# Patient Record
Sex: MALE | Race: WHITE | NOT HISPANIC OR LATINO | URBAN - METROPOLITAN AREA
[De-identification: names, ages, dates, MRNs, and addresses within clinical notes are randomized per-mention and may not be internally consistent; named-entity substitution may affect disease eponyms.]

---

## 2017-06-27 ENCOUNTER — INPATIENT (INPATIENT)
Facility: HOSPITAL | Age: 52
LOS: 2 days | Discharge: HOME | End: 2017-06-30
Attending: INTERNAL MEDICINE

## 2017-07-05 DIAGNOSIS — M19.071 PRIMARY OSTEOARTHRITIS, RIGHT ANKLE AND FOOT: ICD-10-CM

## 2017-07-05 DIAGNOSIS — G62.9 POLYNEUROPATHY, UNSPECIFIED: ICD-10-CM

## 2017-07-05 DIAGNOSIS — Z82.49 FAMILY HISTORY OF ISCHEMIC HEART DISEASE AND OTHER DISEASES OF THE CIRCULATORY SYSTEM: ICD-10-CM

## 2017-07-05 DIAGNOSIS — M79.2 NEURALGIA AND NEURITIS, UNSPECIFIED: ICD-10-CM

## 2017-07-05 DIAGNOSIS — L03.115 CELLULITIS OF RIGHT LOWER LIMB: ICD-10-CM

## 2017-07-05 DIAGNOSIS — M54.32 SCIATICA, LEFT SIDE: ICD-10-CM

## 2017-07-05 DIAGNOSIS — M54.31 SCIATICA, RIGHT SIDE: ICD-10-CM
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
## 2017-07-05 DIAGNOSIS — M51.26 OTHER INTERVERTEBRAL DISC DISPLACEMENT, LUMBAR REGION: ICD-10-CM

## 2017-07-05 DIAGNOSIS — L97.519 NON-PRESSURE CHRONIC ULCER OF OTHER PART OF RIGHT FOOT WITH UNSPECIFIED SEVERITY: ICD-10-CM

## 2017-07-05 DIAGNOSIS — L97.529 NON-PRESSURE CHRONIC ULCER OF OTHER PART OF LEFT FOOT WITH UNSPECIFIED SEVERITY: ICD-10-CM

## 2017-07-13 DIAGNOSIS — M84.474D: ICD-10-CM

## 2017-07-13 DIAGNOSIS — L97.519 NON-PRESSURE CHRONIC ULCER OF OTHER PART OF RIGHT FOOT WITH UNSPECIFIED SEVERITY: ICD-10-CM

## 2018-08-02 ENCOUNTER — OUTPATIENT (OUTPATIENT)
Dept: OUTPATIENT SERVICES | Facility: HOSPITAL | Age: 53
LOS: 1 days | Discharge: HOME | End: 2018-08-02

## 2018-08-02 DIAGNOSIS — L97.519 NON-PRESSURE CHRONIC ULCER OF OTHER PART OF RIGHT FOOT WITH UNSPECIFIED SEVERITY: ICD-10-CM

## 2019-12-11 ENCOUNTER — APPOINTMENT (OUTPATIENT)
Dept: UROLOGY | Facility: CLINIC | Age: 54
End: 2019-12-11
Payer: COMMERCIAL

## 2019-12-11 DIAGNOSIS — Z87.39 PERSONAL HISTORY OF OTHER DISEASES OF THE MUSCULOSKELETAL SYSTEM AND CONNECTIVE TISSUE: ICD-10-CM

## 2019-12-11 DIAGNOSIS — N40.1 BENIGN PROSTATIC HYPERPLASIA WITH LOWER URINARY TRACT SYMPMS: ICD-10-CM

## 2019-12-11 DIAGNOSIS — Z83.3 FAMILY HISTORY OF DIABETES MELLITUS: ICD-10-CM

## 2019-12-11 DIAGNOSIS — K46.9 UNSPECIFIED ABDOMINAL HERNIA W/OUT OBSTRUCTION OR GANGRENE: ICD-10-CM

## 2019-12-11 DIAGNOSIS — R35.0 FREQUENCY OF MICTURITION: ICD-10-CM

## 2019-12-11 DIAGNOSIS — Z78.9 OTHER SPECIFIED HEALTH STATUS: ICD-10-CM

## 2019-12-11 DIAGNOSIS — N13.8 BENIGN PROSTATIC HYPERPLASIA WITH LOWER URINARY TRACT SYMPMS: ICD-10-CM

## 2019-12-11 DIAGNOSIS — Z82.49 FAMILY HISTORY OF ISCHEMIC HEART DISEASE AND OTHER DISEASES OF THE CIRCULATORY SYSTEM: ICD-10-CM

## 2019-12-11 DIAGNOSIS — Z86.69 PERSONAL HISTORY OF OTHER DISEASES OF THE NERVOUS SYSTEM AND SENSE ORGANS: ICD-10-CM

## 2019-12-11 DIAGNOSIS — R39.12 POOR URINARY STREAM: ICD-10-CM

## 2019-12-11 DIAGNOSIS — N28.1 CYST OF KIDNEY, ACQUIRED: ICD-10-CM

## 2019-12-11 DIAGNOSIS — Z87.2 PERSONAL HISTORY OF DISEASES OF THE SKIN AND SUBCUTANEOUS TISSUE: ICD-10-CM

## 2019-12-11 DIAGNOSIS — Z87.898 PERSONAL HISTORY OF OTHER SPECIFIED CONDITIONS: ICD-10-CM

## 2019-12-11 PROBLEM — Z00.00 ENCOUNTER FOR PREVENTIVE HEALTH EXAMINATION: Status: ACTIVE | Noted: 2019-12-11

## 2019-12-11 PROCEDURE — 99204 OFFICE O/P NEW MOD 45 MIN: CPT

## 2019-12-11 RX ORDER — AMLODIPINE BESYLATE 5 MG/1
TABLET ORAL
Refills: 0 | Status: ACTIVE | COMMUNITY

## 2019-12-11 NOTE — REVIEW OF SYSTEMS
[Fever] : no fever [Shortness Of Breath] : no shortness of breath [Chest Pain] : no chest pain [Constipation] : no constipation [Dysuria] : no dysuria [Confused] : no confusion

## 2019-12-11 NOTE — HISTORY OF PRESENT ILLNESS
[FreeTextEntry1] : 54-year-old with history of right adrenal lesion first found incidentally on MRI of the spine several years ago. He was recommended to see a urologist by his primary but the patient says he" neglected to take care of it". He states one and a half to 2 years ago who was a 6 cm mass in the right kidney. Currently I have reviewed a sonogram report from August 2019 which shows a 9.7 cm mass in the lower to midpole of the right kidney with solid components consistent with renal cell carcinoma. The patient has no flank pain and no gross hematuria.\par \par He has a long history of elevated PSA. His PSA was 8 according to the patient 7 years ago and another urologist  recommended a biopsy of the prostate and the patient refused. His most recent PSA in July of 2019 is 4.9. He has nocturia x0-3, weak urinary flow usually, sometimes strains to urinate and has frequency and urgency and daytime and about half the time a sensation of incomplete emptying. There is no dysuria.  His father had prostate cancer Successfully treated in his 70s

## 2019-12-11 NOTE — PHYSICAL EXAM
[General Appearance - In No Acute Distress] : no acute distress [Edema] : no peripheral edema [] : no respiratory distress [Prostate Tenderness] : the prostate was not tender [Costovertebral Angle Tenderness] : no ~M costovertebral angle tenderness [No Prostate Nodules] : no prostate nodules [Prostate Size ___ (0-4)] : prostate size [unfilled] (scale: 0-4) [Oriented To Time, Place, And Person] : oriented to person, place, and time [Normal Station and Gait] : the gait and station were normal for the patient's age

## 2019-12-11 NOTE — ASSESSMENT
[FreeTextEntry1] : Patient with significant right renal mass on sonogram with solid appearance. I explained to the patient this may very well be a renal cell carcinoma and should be evaluated and treated. Her recommend CT scan pre-and post IV contrast and discussed risks benefits and alternatives including anaphylactoid reactions. Patient agrees to proceed.  We'll get creatinine first and then follow up to discuss results.\par \par Patient with elevated PSA and a family history of prostate cancer. He has not had a PSA in 5 months I recommend repeating a still elevated then transrectal ultrasound and biopsy.\par \par Patient is going away for the holidays and cannot followup until early January. He understands risk of delay

## 2019-12-12 ENCOUNTER — OTHER (OUTPATIENT)
Age: 54
End: 2019-12-12

## 2020-01-07 ENCOUNTER — APPOINTMENT (OUTPATIENT)
Dept: UROLOGY | Facility: CLINIC | Age: 55
End: 2020-01-07
Payer: COMMERCIAL

## 2020-01-07 PROCEDURE — 99213 OFFICE O/P EST LOW 20 MIN: CPT

## 2020-01-07 NOTE — PHYSICAL EXAM
[General Appearance - In No Acute Distress] : no acute distress [Abdomen Tenderness] : non-tender [] : no respiratory distress [Oriented To Time, Place, And Person] : oriented to person, place, and time [Normal Station and Gait] : the gait and station were normal for the patient's age

## 2020-01-07 NOTE — ASSESSMENT
[FreeTextEntry1] : Discussed for large right renal mass and patient understands this is likely to be renal cancer. Also discussed possible benign etiologies. Discussed with him the need for excision and discussed likely need for complete nephrectomy versus partial. I also discussed with patient his elevated PSA which persists and history of prostate cancer and I recommend transrectal ultrasound and biopsy the patient has refused in the past. Patient prefers and agrees to consultation with Dr. Strange urologic oncologist

## 2020-01-07 NOTE — HISTORY OF PRESENT ILLNESS
[FreeTextEntry1] : 54-year-old with right renal mass. CT scan shows solid mass 9 cm in the right kidney with necrotic areas. There is no CT evidence of spread. There is a 6 mm right upper pole stone and a tiny left renal stone. There is no flank pain or hematuria.\par \par PSA is 6. It was 8 7 years ago according to the patient and 4.9 6 months ago. There is no bone pain. He has a variable urinary stream, has frequency and urgency and nocturia x0-3. Father had prostate cancer

## 2020-01-09 ENCOUNTER — APPOINTMENT (OUTPATIENT)
Dept: UROLOGY | Facility: CLINIC | Age: 55
End: 2020-01-09
Payer: COMMERCIAL

## 2020-01-09 DIAGNOSIS — C64.1 MALIGNANT NEOPLASM OF RIGHT KIDNEY, EXCEPT RENAL PELVIS: ICD-10-CM

## 2020-01-09 PROCEDURE — 99214 OFFICE O/P EST MOD 30 MIN: CPT

## 2020-01-09 NOTE — ASSESSMENT
[FreeTextEntry1] : 53 yo with right renal mass\par 9 cm in size - known tumor for atleast 2 years\par with a 1 cm/year rate of growth\par \par we discussed renal sparing surgery\par we discussed the likelihood of a radical nephrectomy is in excess of 90%\par I explained that we can attempt a partial but if invasion of technical factors\par prevent us from proceeding - we would take the entire kidney\par \par the risk of bleeding infection, urinoma clearly outlined\par all questions answered\par \par - right partial possible radical nephrectomy\par - disc internalized and will be reviewed\par - mgcitrate bowel prep\par all questions answered

## 2020-01-09 NOTE — PHYSICAL EXAM
[General Appearance - Well Developed] : well developed [Normal Appearance] : normal appearance [General Appearance - Well Nourished] : well nourished [General Appearance - In No Acute Distress] : no acute distress [Well Groomed] : well groomed [Abdomen Soft] : soft [Abdomen Tenderness] : non-tender [Costovertebral Angle Tenderness] : no ~M costovertebral angle tenderness [Edema] : no peripheral edema [Respiration, Rhythm And Depth] : normal respiratory rhythm and effort [] : no respiratory distress [Oriented To Time, Place, And Person] : oriented to person, place, and time [Affect] : the affect was normal [Exaggerated Use Of Accessory Muscles For Inspiration] : no accessory muscle use [Not Anxious] : not anxious [Mood] : the mood was normal [No Focal Deficits] : no focal deficits [Normal Station and Gait] : the gait and station were normal for the patient's age [No Palpable Adenopathy] : no palpable adenopathy

## 2020-01-09 NOTE — HISTORY OF PRESENT ILLNESS
[FreeTextEntry1] : 53 yo with right renal mass \par referred by Dr. Saavedra for discussion on the surgical management of his tumor\par \par the CT scan was reviewed\par the tumor outlined\par the vasculature was highlighted \par \par the surgery was discussed in detail\par \par according to the patient he knew of the lesion 2 years ago - at that time it was 7 cm\par chose not to do anything since he just started a new job\par \par  [Urinary Frequency] : urinary frequency [Weak Stream] : weak stream

## 2020-01-09 NOTE — REVIEW OF SYSTEMS
[Feeling Tired] : not feeling tired [Feeling Poorly] : not feeling poorly [Recent Weight Gain (___ Lbs)] : no recent weight gain [see HPI] : see HPI [Arthralgias] : arthralgias [Limb Swelling] : no limb swelling [Limb Weakness] : limb weakness [Negative] : Endocrine

## 2020-02-12 ENCOUNTER — OUTPATIENT (OUTPATIENT)
Dept: OUTPATIENT SERVICES | Facility: HOSPITAL | Age: 55
LOS: 1 days | Discharge: HOME | End: 2020-02-12
Payer: COMMERCIAL

## 2020-02-12 VITALS
WEIGHT: 269.85 LBS | TEMPERATURE: 98 F | OXYGEN SATURATION: 96 % | RESPIRATION RATE: 15 BRPM | HEART RATE: 86 BPM | SYSTOLIC BLOOD PRESSURE: 134 MMHG | HEIGHT: 72 IN | DIASTOLIC BLOOD PRESSURE: 75 MMHG

## 2020-02-12 DIAGNOSIS — C64.1 MALIGNANT NEOPLASM OF RIGHT KIDNEY, EXCEPT RENAL PELVIS: ICD-10-CM

## 2020-02-12 DIAGNOSIS — Z01.818 ENCOUNTER FOR OTHER PREPROCEDURAL EXAMINATION: ICD-10-CM

## 2020-02-12 LAB
ALBUMIN SERPL ELPH-MCNC: 4.7 G/DL — SIGNIFICANT CHANGE UP (ref 3.5–5.2)
ALP SERPL-CCNC: 95 U/L — SIGNIFICANT CHANGE UP (ref 30–115)
ALT FLD-CCNC: 58 U/L — HIGH (ref 0–41)
ANION GAP SERPL CALC-SCNC: 15 MMOL/L — HIGH (ref 7–14)
APPEARANCE UR: ABNORMAL
APTT BLD: 32.6 SEC — SIGNIFICANT CHANGE UP (ref 27–39.2)
AST SERPL-CCNC: 32 U/L — SIGNIFICANT CHANGE UP (ref 0–41)
BACTERIA # UR AUTO: ABNORMAL
BILIRUB SERPL-MCNC: 0.5 MG/DL — SIGNIFICANT CHANGE UP (ref 0.2–1.2)
BILIRUB UR-MCNC: NEGATIVE — SIGNIFICANT CHANGE UP
BLD GP AB SCN SERPL QL: SIGNIFICANT CHANGE UP
BUN SERPL-MCNC: 15 MG/DL — SIGNIFICANT CHANGE UP (ref 10–20)
CALCIUM SERPL-MCNC: 9.5 MG/DL — SIGNIFICANT CHANGE UP (ref 8.5–10.1)
CHLORIDE SERPL-SCNC: 102 MMOL/L — SIGNIFICANT CHANGE UP (ref 98–110)
CO2 SERPL-SCNC: 25 MMOL/L — SIGNIFICANT CHANGE UP (ref 17–32)
COLOR SPEC: YELLOW — SIGNIFICANT CHANGE UP
CREAT SERPL-MCNC: 0.8 MG/DL — SIGNIFICANT CHANGE UP (ref 0.7–1.5)
DIFF PNL FLD: NEGATIVE — SIGNIFICANT CHANGE UP
EPI CELLS # UR: 1 /HPF — SIGNIFICANT CHANGE UP (ref 0–5)
GLUCOSE SERPL-MCNC: 101 MG/DL — HIGH (ref 70–99)
GLUCOSE UR QL: SIGNIFICANT CHANGE UP
HCT VFR BLD CALC: 45.9 % — SIGNIFICANT CHANGE UP (ref 42–52)
HGB BLD-MCNC: 15.8 G/DL — SIGNIFICANT CHANGE UP (ref 14–18)
HYALINE CASTS # UR AUTO: 39 /LPF — HIGH (ref 0–7)
INR BLD: 0.98 RATIO — SIGNIFICANT CHANGE UP (ref 0.65–1.3)
KETONES UR-MCNC: SIGNIFICANT CHANGE UP
LEUKOCYTE ESTERASE UR-ACNC: NEGATIVE — SIGNIFICANT CHANGE UP
MCHC RBC-ENTMCNC: 29.6 PG — SIGNIFICANT CHANGE UP (ref 27–31)
MCHC RBC-ENTMCNC: 34.4 G/DL — SIGNIFICANT CHANGE UP (ref 32–37)
MCV RBC AUTO: 86 FL — SIGNIFICANT CHANGE UP (ref 80–94)
NITRITE UR-MCNC: NEGATIVE — SIGNIFICANT CHANGE UP
NRBC # BLD: 0 /100 WBCS — SIGNIFICANT CHANGE UP (ref 0–0)
PH UR: 6 — SIGNIFICANT CHANGE UP (ref 5–8)
PLATELET # BLD AUTO: 284 K/UL — SIGNIFICANT CHANGE UP (ref 130–400)
POTASSIUM SERPL-MCNC: 4.6 MMOL/L — SIGNIFICANT CHANGE UP (ref 3.5–5)
POTASSIUM SERPL-SCNC: 4.6 MMOL/L — SIGNIFICANT CHANGE UP (ref 3.5–5)
PROT SERPL-MCNC: 7.6 G/DL — SIGNIFICANT CHANGE UP (ref 6–8)
PROT UR-MCNC: ABNORMAL
PROTHROM AB SERPL-ACNC: 11.3 SEC — SIGNIFICANT CHANGE UP (ref 9.95–12.87)
RBC # BLD: 5.34 M/UL — SIGNIFICANT CHANGE UP (ref 4.7–6.1)
RBC # FLD: 13.9 % — SIGNIFICANT CHANGE UP (ref 11.5–14.5)
RBC CASTS # UR COMP ASSIST: 0 /HPF — SIGNIFICANT CHANGE UP (ref 0–4)
SODIUM SERPL-SCNC: 142 MMOL/L — SIGNIFICANT CHANGE UP (ref 135–146)
SP GR SPEC: 1.03 — HIGH (ref 1.01–1.02)
UROBILINOGEN FLD QL: SIGNIFICANT CHANGE UP
WBC # BLD: 6.68 K/UL — SIGNIFICANT CHANGE UP (ref 4.8–10.8)
WBC # FLD AUTO: 6.68 K/UL — SIGNIFICANT CHANGE UP (ref 4.8–10.8)
WBC UR QL: 87 /HPF — HIGH (ref 0–5)

## 2020-02-12 PROCEDURE — 93010 ELECTROCARDIOGRAM REPORT: CPT

## 2020-02-12 PROCEDURE — 71046 X-RAY EXAM CHEST 2 VIEWS: CPT | Mod: 26

## 2020-02-12 NOTE — H&P PST ADULT - NSICDXPASTMEDICALHX_GEN_ALL_CORE_FT
PAST MEDICAL HISTORY:  Back pain     Back pain with sciatica     Mild HTN     Numbness legs    Obesity     Peripheral neuropathy     PVD (peripheral vascular disease)     Sleep apnea possible un diagnosed    Umbilical hernia

## 2020-02-12 NOTE — H&P PST ADULT - HISTORY OF PRESENT ILLNESS
54 year old male here to remove mass in right kidney was found 2 years  while having a mri on his back and has grown in the last two years approx 2 cm  fos= 1-2  denies chest pain sob palp  denies recent uri or uti

## 2020-02-12 NOTE — H&P PST ADULT - EXTREMITIES COMMENTS
lower right leg slight red bandage on foot states healing cellulitis /foot clearence sent out for eval

## 2020-02-13 LAB
CULTURE RESULTS: NO GROWTH — SIGNIFICANT CHANGE UP
SPECIMEN SOURCE: SIGNIFICANT CHANGE UP

## 2020-02-17 PROBLEM — M54.9 DORSALGIA, UNSPECIFIED: Chronic | Status: ACTIVE | Noted: 2020-02-12

## 2020-02-17 PROBLEM — K42.9 UMBILICAL HERNIA WITHOUT OBSTRUCTION OR GANGRENE: Chronic | Status: ACTIVE | Noted: 2020-02-12

## 2020-02-17 PROBLEM — I73.9 PERIPHERAL VASCULAR DISEASE, UNSPECIFIED: Chronic | Status: ACTIVE | Noted: 2020-02-12

## 2020-02-17 PROBLEM — R20.0 ANESTHESIA OF SKIN: Chronic | Status: ACTIVE | Noted: 2020-02-12

## 2020-02-17 PROBLEM — I10 ESSENTIAL (PRIMARY) HYPERTENSION: Chronic | Status: ACTIVE | Noted: 2020-02-12

## 2020-02-17 PROBLEM — G47.30 SLEEP APNEA, UNSPECIFIED: Chronic | Status: ACTIVE | Noted: 2020-02-12

## 2020-02-17 PROBLEM — E66.9 OBESITY, UNSPECIFIED: Chronic | Status: ACTIVE | Noted: 2020-02-12

## 2020-02-17 PROBLEM — G62.9 POLYNEUROPATHY, UNSPECIFIED: Chronic | Status: ACTIVE | Noted: 2020-02-12

## 2020-02-26 ENCOUNTER — INPATIENT (INPATIENT)
Facility: HOSPITAL | Age: 55
LOS: 1 days | Discharge: HOME | End: 2020-02-28
Attending: UROLOGY | Admitting: UROLOGY
Payer: COMMERCIAL

## 2020-02-26 ENCOUNTER — RESULT REVIEW (OUTPATIENT)
Age: 55
End: 2020-02-26

## 2020-02-26 ENCOUNTER — APPOINTMENT (OUTPATIENT)
Dept: UROLOGY | Facility: HOSPITAL | Age: 55
End: 2020-02-26
Payer: COMMERCIAL

## 2020-02-26 VITALS
HEIGHT: 72 IN | SYSTOLIC BLOOD PRESSURE: 133 MMHG | RESPIRATION RATE: 18 BRPM | WEIGHT: 250 LBS | DIASTOLIC BLOOD PRESSURE: 74 MMHG | TEMPERATURE: 98 F | HEART RATE: 91 BPM

## 2020-02-26 LAB
ANION GAP SERPL CALC-SCNC: 11 MMOL/L — SIGNIFICANT CHANGE UP (ref 7–14)
BUN SERPL-MCNC: 15 MG/DL — SIGNIFICANT CHANGE UP (ref 10–20)
CALCIUM SERPL-MCNC: 8.3 MG/DL — LOW (ref 8.5–10.1)
CHLORIDE SERPL-SCNC: 103 MMOL/L — SIGNIFICANT CHANGE UP (ref 98–110)
CO2 SERPL-SCNC: 24 MMOL/L — SIGNIFICANT CHANGE UP (ref 17–32)
CREAT SERPL-MCNC: 1.1 MG/DL — SIGNIFICANT CHANGE UP (ref 0.7–1.5)
GLUCOSE SERPL-MCNC: 159 MG/DL — HIGH (ref 70–99)
HCT VFR BLD CALC: 36.6 % — LOW (ref 42–52)
HGB BLD-MCNC: 12.2 G/DL — LOW (ref 14–18)
MCHC RBC-ENTMCNC: 28.4 PG — SIGNIFICANT CHANGE UP (ref 27–31)
MCHC RBC-ENTMCNC: 33.3 G/DL — SIGNIFICANT CHANGE UP (ref 32–37)
MCV RBC AUTO: 85.3 FL — SIGNIFICANT CHANGE UP (ref 80–94)
NRBC # BLD: 0 /100 WBCS — SIGNIFICANT CHANGE UP (ref 0–0)
PLATELET # BLD AUTO: 204 K/UL — SIGNIFICANT CHANGE UP (ref 130–400)
POTASSIUM SERPL-MCNC: 4.2 MMOL/L — SIGNIFICANT CHANGE UP (ref 3.5–5)
POTASSIUM SERPL-SCNC: 4.2 MMOL/L — SIGNIFICANT CHANGE UP (ref 3.5–5)
RBC # BLD: 4.29 M/UL — LOW (ref 4.7–6.1)
RBC # FLD: 13.7 % — SIGNIFICANT CHANGE UP (ref 11.5–14.5)
SODIUM SERPL-SCNC: 138 MMOL/L — SIGNIFICANT CHANGE UP (ref 135–146)
WBC # BLD: 8.67 K/UL — SIGNIFICANT CHANGE UP (ref 4.8–10.8)
WBC # FLD AUTO: 8.67 K/UL — SIGNIFICANT CHANGE UP (ref 4.8–10.8)

## 2020-02-26 PROCEDURE — 50230 REMOVAL KIDNEY OPEN RADICAL: CPT

## 2020-02-26 PROCEDURE — 88307 TISSUE EXAM BY PATHOLOGIST: CPT | Mod: 26

## 2020-02-26 RX ORDER — PREGABALIN 225 MG/1
1000 CAPSULE ORAL DAILY
Refills: 0 | Status: DISCONTINUED | OUTPATIENT
Start: 2020-02-26 | End: 2020-02-28

## 2020-02-26 RX ORDER — DULOXETINE HYDROCHLORIDE 30 MG/1
60 CAPSULE, DELAYED RELEASE ORAL
Refills: 0 | Status: DISCONTINUED | OUTPATIENT
Start: 2020-02-26 | End: 2020-02-28

## 2020-02-26 RX ORDER — AMLODIPINE BESYLATE AND BENAZEPRIL HYDROCHLORIDE 10; 20 MG/1; MG/1
1 CAPSULE ORAL
Qty: 0 | Refills: 0 | DISCHARGE

## 2020-02-26 RX ORDER — KETOROLAC TROMETHAMINE 30 MG/ML
30 SYRINGE (ML) INJECTION ONCE
Refills: 0 | Status: DISCONTINUED | OUTPATIENT
Start: 2020-02-26 | End: 2020-02-26

## 2020-02-26 RX ORDER — ACETAMINOPHEN 500 MG
650 TABLET ORAL EVERY 6 HOURS
Refills: 0 | Status: DISCONTINUED | OUTPATIENT
Start: 2020-02-26 | End: 2020-02-28

## 2020-02-26 RX ORDER — CEFAZOLIN SODIUM 1 G
1000 VIAL (EA) INJECTION EVERY 8 HOURS
Refills: 0 | Status: COMPLETED | OUTPATIENT
Start: 2020-02-26 | End: 2020-02-27

## 2020-02-26 RX ORDER — SODIUM CHLORIDE 9 MG/ML
1000 INJECTION INTRAMUSCULAR; INTRAVENOUS; SUBCUTANEOUS
Refills: 0 | Status: DISCONTINUED | OUTPATIENT
Start: 2020-02-26 | End: 2020-02-28

## 2020-02-26 RX ORDER — AMLODIPINE BESYLATE 2.5 MG/1
5 TABLET ORAL DAILY
Refills: 0 | Status: DISCONTINUED | OUTPATIENT
Start: 2020-02-26 | End: 2020-02-28

## 2020-02-26 RX ORDER — CHOLECALCIFEROL (VITAMIN D3) 125 MCG
2000 CAPSULE ORAL DAILY
Refills: 0 | Status: DISCONTINUED | OUTPATIENT
Start: 2020-02-26 | End: 2020-02-28

## 2020-02-26 RX ORDER — UBIDECARENONE 100 MG
1 CAPSULE ORAL
Qty: 0 | Refills: 0 | DISCHARGE

## 2020-02-26 RX ORDER — OXYCODONE HYDROCHLORIDE 5 MG/1
10 TABLET ORAL EVERY 6 HOURS
Refills: 0 | Status: DISCONTINUED | OUTPATIENT
Start: 2020-02-26 | End: 2020-02-28

## 2020-02-26 RX ORDER — HYDROMORPHONE HYDROCHLORIDE 2 MG/ML
1 INJECTION INTRAMUSCULAR; INTRAVENOUS; SUBCUTANEOUS
Refills: 0 | Status: DISCONTINUED | OUTPATIENT
Start: 2020-02-26 | End: 2020-02-26

## 2020-02-26 RX ORDER — ONDANSETRON 8 MG/1
4 TABLET, FILM COATED ORAL ONCE
Refills: 0 | Status: DISCONTINUED | OUTPATIENT
Start: 2020-02-26 | End: 2020-02-26

## 2020-02-26 RX ORDER — OXYCODONE HYDROCHLORIDE 5 MG/1
5 TABLET ORAL EVERY 6 HOURS
Refills: 0 | Status: DISCONTINUED | OUTPATIENT
Start: 2020-02-26 | End: 2020-02-28

## 2020-02-26 RX ORDER — OXYCODONE HYDROCHLORIDE 5 MG/1
5 TABLET ORAL ONCE
Refills: 0 | Status: DISCONTINUED | OUTPATIENT
Start: 2020-02-26 | End: 2020-02-26

## 2020-02-26 RX ORDER — HYDROMORPHONE HYDROCHLORIDE 2 MG/ML
0.5 INJECTION INTRAMUSCULAR; INTRAVENOUS; SUBCUTANEOUS
Refills: 0 | Status: DISCONTINUED | OUTPATIENT
Start: 2020-02-26 | End: 2020-02-26

## 2020-02-26 RX ORDER — LISINOPRIL 2.5 MG/1
10 TABLET ORAL DAILY
Refills: 0 | Status: DISCONTINUED | OUTPATIENT
Start: 2020-02-26 | End: 2020-02-28

## 2020-02-26 RX ORDER — HEPARIN SODIUM 5000 [USP'U]/ML
5000 INJECTION INTRAVENOUS; SUBCUTANEOUS THREE TIMES A DAY
Refills: 0 | Status: DISCONTINUED | OUTPATIENT
Start: 2020-02-26 | End: 2020-02-28

## 2020-02-26 RX ORDER — SODIUM CHLORIDE 9 MG/ML
1000 INJECTION, SOLUTION INTRAVENOUS
Refills: 0 | Status: DISCONTINUED | OUTPATIENT
Start: 2020-02-26 | End: 2020-02-26

## 2020-02-26 RX ADMIN — Medication 650 MILLIGRAM(S): at 23:06

## 2020-02-26 RX ADMIN — SODIUM CHLORIDE 125 MILLILITER(S): 9 INJECTION, SOLUTION INTRAVENOUS at 13:20

## 2020-02-26 RX ADMIN — Medication 100 MILLIGRAM(S): at 23:07

## 2020-02-26 RX ADMIN — Medication 1 APPLICATION(S): at 18:21

## 2020-02-26 RX ADMIN — HEPARIN SODIUM 5000 UNIT(S): 5000 INJECTION INTRAVENOUS; SUBCUTANEOUS at 21:53

## 2020-02-26 RX ADMIN — HYDROMORPHONE HYDROCHLORIDE 1 MILLIGRAM(S): 2 INJECTION INTRAMUSCULAR; INTRAVENOUS; SUBCUTANEOUS at 14:01

## 2020-02-26 RX ADMIN — Medication 650 MILLIGRAM(S): at 17:57

## 2020-02-26 RX ADMIN — Medication 150 MILLIGRAM(S): at 23:06

## 2020-02-26 RX ADMIN — Medication 150 MILLIGRAM(S): at 17:57

## 2020-02-26 RX ADMIN — DULOXETINE HYDROCHLORIDE 60 MILLIGRAM(S): 30 CAPSULE, DELAYED RELEASE ORAL at 17:57

## 2020-02-26 RX ADMIN — OXYCODONE HYDROCHLORIDE 10 MILLIGRAM(S): 5 TABLET ORAL at 21:55

## 2020-02-26 RX ADMIN — HYDROMORPHONE HYDROCHLORIDE 1 MILLIGRAM(S): 2 INJECTION INTRAMUSCULAR; INTRAVENOUS; SUBCUTANEOUS at 13:50

## 2020-02-26 RX ADMIN — HEPARIN SODIUM 5000 UNIT(S): 5000 INJECTION INTRAVENOUS; SUBCUTANEOUS at 14:00

## 2020-02-26 NOTE — CHART NOTE - NSCHARTNOTEFT_GEN_A_CORE
PACU ANESTHESIA ADMISSION NOTE      Procedure: Radical nephrectomy with ureterotomy    Post op diagnosis:  Renal cancer      ____  Intubated  TV:______       Rate: ______      FiO2: ______    ____X  Patent Airway    ___X_  Full return of protective reflexes    ___X_  Full recovery from anesthesia / back to baseline     Vitals:   T:  97.5         R:  16                BP: 139/74                 Sat:   98                P: 79      Mental Status:  ___X_ Awake  X _____ Alert   _____ Drowsy   _____ Sedated    Nausea/Vomiting:  _X___ NO  ______Yes,   See Post - Op Orders          Pain Scale (0-10):  ___0__    Treatment: ____ None    ____ See Post - Op/PCA Orders    Post - Operative Fluids:   ____ Oral   ___X_ See Post - Op Orders    Plan: Discharge:   ____Home       _X____Floor     _____Critical Care    _____  Other:_________________    Comments Uneventful course of anesthesia

## 2020-02-26 NOTE — ASU PATIENT PROFILE, ADULT - PMH
Back pain    Back pain with sciatica    Mild HTN    Numbness  legs  Obesity    Peripheral neuropathy    PVD (peripheral vascular disease)    Sleep apnea  possible un diagnosed  Umbilical hernia

## 2020-02-26 NOTE — PROGRESS NOTE ADULT - ASSESSMENT
Pt is a 54year old male POD #0, s/p R radical nephrectomy- patient doing well    A)   s/p R radical nephrectomy     P)  Cont with abx ( Ancef x 2 doses)   Cont with pain control   f/u am labs  Incentive spirometry  OOB to chair and ambulate as tolerated   Cont with DVT ppx   Will d/w attng Pt is a 54year old male POD #0, s/p R radical nephrectomy- patient doing well    A)   s/p R radical nephrectomy     P)  Cont with abx ( Ancef x 2 doses)   Cont with pain control   f/u am labs, monitor H+H  Incentive spirometry  OOB to chair and ambulate as tolerated   Cont with DVT ppx   Will d/w attng Pt is a 54year old male POD #0, s/p R radical nephrectomy- patient doing well    A)   s/p R radical nephrectomy     P)  Cont with abx ( Ancef x 2 doses)   Cont with pain control   Advance diet to clear liquids as tolerated   f/u am labs, monitor H+H  Incentive spirometry  OOB to chair and ambulate as tolerated   Cont with DVT ppx   Will d/w attng

## 2020-02-26 NOTE — PROGRESS NOTE ADULT - SUBJECTIVE AND OBJECTIVE BOX
Post- Op Check   s/p R radical nephrectomy   POD#0    Patient is a 54 year old male POD#0 s/p R radical nephrectomy. Patient seen and examined today at bedside. Patient doing well, pain is currently well controlled. Denies chest pain, SOB, vomitting.     Vital Signs Last 24 Hrs  T(C): 36.6 (26 Feb 2020 14:00), Max: 36.7 (26 Feb 2020 06:14)  T(F): 97.8 (26 Feb 2020 14:00), Max: 98.1 (26 Feb 2020 06:14)  HR: 76 (26 Feb 2020 16:00) (71 - 91)  BP: 135/69 (26 Feb 2020 16:00) (114/57 - 154/76)  RR: 18 (26 Feb 2020 16:00) (14 - 20)  SpO2: 97% (26 Feb 2020 16:00) (96% - 99%)    ROS  [x] A ten point review of systems was negative except where noted   [ ] Due to altered mental status/ intubation, subjective information was not able to be obtained from the patient. History was obtained to the extent possible from review of the chart and collateral sources of information    Physical exam  Gen: NAD  HEENT: EOMI  Neck: No pain   Respiratory: no respiratory distress, + face mask   Abd: soft, obese, non- tender, + dressing to R side C/D/I  : + sumner in place draining clear yellow urine   Extremities: no edema   Neurological: A&O x3   Psychiatric: normal mood, normal affect     Urine:     I&O's Summary    26 Feb 2020 07:01  -  26 Feb 2020 16:43  --------------------------------------------------------  IN: 0 mL / OUT: 250 mL / NET: -250 mL      LABS:                        12.2   8.67  )-----------( 204      ( 26 Feb 2020 14:20 )             36.6     02-26    138  |  103  |  15  ----------------------------<  159<H>  4.2   |  24  |  1.1    Ca    8.3<L>      26 Feb 2020 14:20

## 2020-02-27 LAB
ANION GAP SERPL CALC-SCNC: 9 MMOL/L — SIGNIFICANT CHANGE UP (ref 7–14)
BUN SERPL-MCNC: 14 MG/DL — SIGNIFICANT CHANGE UP (ref 10–20)
CALCIUM SERPL-MCNC: 8.6 MG/DL — SIGNIFICANT CHANGE UP (ref 8.5–10.1)
CHLORIDE SERPL-SCNC: 100 MMOL/L — SIGNIFICANT CHANGE UP (ref 98–110)
CO2 SERPL-SCNC: 27 MMOL/L — SIGNIFICANT CHANGE UP (ref 17–32)
CREAT SERPL-MCNC: 1.4 MG/DL — SIGNIFICANT CHANGE UP (ref 0.7–1.5)
GLUCOSE SERPL-MCNC: 139 MG/DL — HIGH (ref 70–99)
HCT VFR BLD CALC: 38 % — LOW (ref 42–52)
HGB BLD-MCNC: 12.4 G/DL — LOW (ref 14–18)
MCHC RBC-ENTMCNC: 28.2 PG — SIGNIFICANT CHANGE UP (ref 27–31)
MCHC RBC-ENTMCNC: 32.6 G/DL — SIGNIFICANT CHANGE UP (ref 32–37)
MCV RBC AUTO: 86.6 FL — SIGNIFICANT CHANGE UP (ref 80–94)
NRBC # BLD: 0 /100 WBCS — SIGNIFICANT CHANGE UP (ref 0–0)
PLATELET # BLD AUTO: 206 K/UL — SIGNIFICANT CHANGE UP (ref 130–400)
POTASSIUM SERPL-MCNC: 4.6 MMOL/L — SIGNIFICANT CHANGE UP (ref 3.5–5)
POTASSIUM SERPL-SCNC: 4.6 MMOL/L — SIGNIFICANT CHANGE UP (ref 3.5–5)
RBC # BLD: 4.39 M/UL — LOW (ref 4.7–6.1)
RBC # FLD: 13.8 % — SIGNIFICANT CHANGE UP (ref 11.5–14.5)
SODIUM SERPL-SCNC: 136 MMOL/L — SIGNIFICANT CHANGE UP (ref 135–146)
WBC # BLD: 7.88 K/UL — SIGNIFICANT CHANGE UP (ref 4.8–10.8)
WBC # FLD AUTO: 7.88 K/UL — SIGNIFICANT CHANGE UP (ref 4.8–10.8)

## 2020-02-27 RX ORDER — SODIUM CHLORIDE 9 MG/ML
3 INJECTION INTRAMUSCULAR; INTRAVENOUS; SUBCUTANEOUS EVERY 8 HOURS
Refills: 0 | Status: DISCONTINUED | OUTPATIENT
Start: 2020-02-27 | End: 2020-02-28

## 2020-02-27 RX ADMIN — DULOXETINE HYDROCHLORIDE 60 MILLIGRAM(S): 30 CAPSULE, DELAYED RELEASE ORAL at 17:05

## 2020-02-27 RX ADMIN — Medication 650 MILLIGRAM(S): at 17:05

## 2020-02-27 RX ADMIN — PREGABALIN 1000 MICROGRAM(S): 225 CAPSULE ORAL at 12:31

## 2020-02-27 RX ADMIN — OXYCODONE HYDROCHLORIDE 10 MILLIGRAM(S): 5 TABLET ORAL at 05:22

## 2020-02-27 RX ADMIN — Medication 150 MILLIGRAM(S): at 23:42

## 2020-02-27 RX ADMIN — DULOXETINE HYDROCHLORIDE 60 MILLIGRAM(S): 30 CAPSULE, DELAYED RELEASE ORAL at 05:23

## 2020-02-27 RX ADMIN — Medication 650 MILLIGRAM(S): at 12:31

## 2020-02-27 RX ADMIN — Medication 650 MILLIGRAM(S): at 13:17

## 2020-02-27 RX ADMIN — Medication 650 MILLIGRAM(S): at 23:42

## 2020-02-27 RX ADMIN — Medication 1 APPLICATION(S): at 05:25

## 2020-02-27 RX ADMIN — HEPARIN SODIUM 5000 UNIT(S): 5000 INJECTION INTRAVENOUS; SUBCUTANEOUS at 21:05

## 2020-02-27 RX ADMIN — Medication 1 APPLICATION(S): at 17:04

## 2020-02-27 RX ADMIN — HEPARIN SODIUM 5000 UNIT(S): 5000 INJECTION INTRAVENOUS; SUBCUTANEOUS at 13:19

## 2020-02-27 RX ADMIN — Medication 2000 UNIT(S): at 12:31

## 2020-02-27 RX ADMIN — Medication 650 MILLIGRAM(S): at 05:23

## 2020-02-27 RX ADMIN — SODIUM CHLORIDE 125 MILLILITER(S): 9 INJECTION INTRAMUSCULAR; INTRAVENOUS; SUBCUTANEOUS at 13:21

## 2020-02-27 RX ADMIN — SODIUM CHLORIDE 3 MILLILITER(S): 9 INJECTION INTRAMUSCULAR; INTRAVENOUS; SUBCUTANEOUS at 20:57

## 2020-02-27 RX ADMIN — AMLODIPINE BESYLATE 5 MILLIGRAM(S): 2.5 TABLET ORAL at 05:23

## 2020-02-27 RX ADMIN — Medication 150 MILLIGRAM(S): at 05:22

## 2020-02-27 RX ADMIN — LISINOPRIL 10 MILLIGRAM(S): 2.5 TABLET ORAL at 05:23

## 2020-02-27 RX ADMIN — HEPARIN SODIUM 5000 UNIT(S): 5000 INJECTION INTRAVENOUS; SUBCUTANEOUS at 05:23

## 2020-02-27 RX ADMIN — Medication 100 MILLIGRAM(S): at 05:23

## 2020-02-27 RX ADMIN — Medication 150 MILLIGRAM(S): at 12:31

## 2020-02-27 RX ADMIN — Medication 150 MILLIGRAM(S): at 17:05

## 2020-02-27 RX ADMIN — Medication 650 MILLIGRAM(S): at 17:37

## 2020-02-27 NOTE — PROGRESS NOTE ADULT - SUBJECTIVE AND OBJECTIVE BOX
HPI:  55 y/o M POD#1 s/p R Radical nephrectomy. Pt seen and examined at bedside. Pt c/o incisional tenderness. Pt tolerating ice chips well. Pt doing well. Pt reports no acute overnight events. Pt denies fevers/chills, nausea, vomiting, chest pain, SOB.    [x] A ten point review of systems was negative except where noted     Vital signs  T(C): , Max: 37.2 (02-26-20 @ 20:00)  HR: 84 (02-27-20 @ 08:02)  BP: 121/56 (02-27-20 @ 08:02)  SpO2: 98% (02-26-20 @ 20:00)    Constitutional: NAD  HEENT: NCAT  Neck: no pain  Respiratory: No accessory respiratory muscle use  Abd: Soft, obese, nontender, + dressing to R side is clean, + R incisional tenderness  : + sumner draining clear urine.   Extremities: no edema  Neurological: A/O x 3  Psychiatric: Normal mood, normal affect  Skin: No rashes    Labs                        12.4   7.88  )-----------( 206      ( 27 Feb 2020 05:03 )             38.0     27 Feb 2020 05:03    136    |  100    |  14     ----------------------------<  139    4.6     |  27     |  1.4      Ca    8.6        27 Feb 2020 05:03      I&O's Detail    26 Feb 2020 07:01  -  27 Feb 2020 07:00  --------------------------------------------------------  IN:    lactated ringers.: 625 mL    sodium chloride 0.9%.: 125 mL  Total IN: 750 mL    OUT:    Indwelling Catheter - Urethral: 1510 mL  Total OUT: 1510 mL    Total NET: -760 mL      27 Feb 2020 07:01  -  27 Feb 2020 09:04  --------------------------------------------------------  IN:    sodium chloride 0.9%.: 250 mL  Total IN: 250 mL    OUT:  Total OUT: 0 mL    Total NET: 250 mL

## 2020-02-27 NOTE — PROGRESS NOTE ADULT - ASSESSMENT
53 y/o M POD#1 s/p R Radical nephrectomy. Pt doing well.     A)  s/p R radical nephrectomy    P)  - c/w pain control  - c/w incentive spirometry  - d/c sumner  - increase ambulation  - advance diet as tolerated  - anticipating discharge  - will d/w attng

## 2020-02-28 ENCOUNTER — TRANSCRIPTION ENCOUNTER (OUTPATIENT)
Age: 55
End: 2020-02-28

## 2020-02-28 VITALS
RESPIRATION RATE: 18 BRPM | TEMPERATURE: 98 F | SYSTOLIC BLOOD PRESSURE: 110 MMHG | HEART RATE: 84 BPM | DIASTOLIC BLOOD PRESSURE: 66 MMHG

## 2020-02-28 RX ORDER — OXYCODONE HYDROCHLORIDE 5 MG/1
1 TABLET ORAL
Qty: 20 | Refills: 0
Start: 2020-02-28 | End: 2020-03-03

## 2020-02-28 RX ORDER — ACETAMINOPHEN 500 MG
2 TABLET ORAL
Qty: 0 | Refills: 0 | DISCHARGE
Start: 2020-02-28

## 2020-02-28 RX ADMIN — PREGABALIN 1000 MICROGRAM(S): 225 CAPSULE ORAL at 11:05

## 2020-02-28 RX ADMIN — Medication 2000 UNIT(S): at 11:05

## 2020-02-28 RX ADMIN — Medication 1 APPLICATION(S): at 05:07

## 2020-02-28 RX ADMIN — DULOXETINE HYDROCHLORIDE 60 MILLIGRAM(S): 30 CAPSULE, DELAYED RELEASE ORAL at 05:08

## 2020-02-28 RX ADMIN — HEPARIN SODIUM 5000 UNIT(S): 5000 INJECTION INTRAVENOUS; SUBCUTANEOUS at 05:08

## 2020-02-28 RX ADMIN — Medication 150 MILLIGRAM(S): at 05:07

## 2020-02-28 RX ADMIN — Medication 650 MILLIGRAM(S): at 11:05

## 2020-02-28 RX ADMIN — LISINOPRIL 10 MILLIGRAM(S): 2.5 TABLET ORAL at 05:07

## 2020-02-28 RX ADMIN — Medication 10 MILLIGRAM(S): at 11:04

## 2020-02-28 RX ADMIN — Medication 650 MILLIGRAM(S): at 05:08

## 2020-02-28 RX ADMIN — SODIUM CHLORIDE 3 MILLILITER(S): 9 INJECTION INTRAMUSCULAR; INTRAVENOUS; SUBCUTANEOUS at 05:10

## 2020-02-28 RX ADMIN — AMLODIPINE BESYLATE 5 MILLIGRAM(S): 2.5 TABLET ORAL at 05:08

## 2020-02-28 RX ADMIN — Medication 650 MILLIGRAM(S): at 11:36

## 2020-02-28 RX ADMIN — Medication 150 MILLIGRAM(S): at 11:05

## 2020-02-28 NOTE — DISCHARGE NOTE PROVIDER - HOSPITAL COURSE
Pt was admitted to the hospital and went to the OR. He underwent an open right radical nephrectomy. The pt did well and was d/c to the floor in stable condition.    The pt continued to do well and was d/d'd home on POD # 2.

## 2020-02-28 NOTE — DISCHARGE NOTE NURSING/CASE MANAGEMENT/SOCIAL WORK - PATIENT PORTAL LINK FT
You can access the FollowMyHealth Patient Portal offered by [facility] by registering at the following website: [email or web address]. By joining [facility]’s FollowMyHealth portal, you will also be able to view your health information using other applications (apps) compatible with our system.

## 2020-02-28 NOTE — PROGRESS NOTE ADULT - SUBJECTIVE AND OBJECTIVE BOX
Progress Note POD # 2    Subjective  55 y/o Male s/p right open nephrectomy. Pt doing better, c/o distended abdomen.  Pt has not had a BM or passed flatus. He is tolerating a diet and is ambulating.    [x] a 10 point review of systems was negative except where noted    [  ]  Due to altered mental status/intubation, subjective information was not able t be obtained from the patient.  History was obtained, to the extent possible, from review of the chart and collateral sources of information.    Vital signs  T(C): , Max: 37.7 (02-28-20 @ 00:06)  HR: 86 (02-28-20 @ 04:25)  BP: 125/60 (02-28-20 @ 04:25)    Gen NC/AT in NAD  Neck Supple  Abd No CVAT, distended tympanitic, soft non tender, dressing clean and dry   Voiding freely.   EXT: no calf tenderness    Labs                        12.4   7.88  )-----------( 206      ( 27 Feb 2020 05:03 )             38.0     27 Feb 2020 05:03    136    |  100    |  14     ----------------------------<  139    4.6     |  27     |  1.4      Ca    8.6        27 Feb 2020 05:03

## 2020-02-28 NOTE — PROGRESS NOTE ADULT - ATTENDING COMMENTS
patient is doing well  stable hgb  heplock IV  regular diet  plan to d/c home in AM
patient doing well  stable for discharge  f/u as outpatient

## 2020-02-28 NOTE — DISCHARGE NOTE PROVIDER - NSDCMRMEDTOKEN_GEN_ALL_CORE_FT
acetaminophen 325 mg oral tablet: 2 tab(s) orally every 6 hours  amlodipine-benazepril 5 mg-10 mg oral capsule: 1 cap(s) orally once a day  CoQ10 300 mg oral capsule: 1 cap(s) orally once a day  DULoxetine 60 mg oral delayed release capsule: 1 cap(s) orally 2 times a day  multivitamins: Apply topically to affected area once a day  nature made cholestoff plus: 1  orally once a day  pregabalin 150 mg oral capsule: 1 cap(s) orally 4 times a day  prostate plus: 1  orally once a day  silver sulfADIAZINE 1% topical cream: Apply topically to affected area 2 times a day  trunature cranberry: 650  orally once a day  Vitamin B12 1000 mcg oral tablet: 1 tab(s) orally once a day  Vitamin D3 2000 intl units (50 mcg) oral tablet: 1 tab(s) orally once a day acetaminophen 325 mg oral tablet: 2 tab(s) orally every 6 hours  amlodipine-benazepril 5 mg-10 mg oral capsule: 1 cap(s) orally once a day  CoQ10 300 mg oral capsule: 1 cap(s) orally once a day  DULoxetine 60 mg oral delayed release capsule: 1 cap(s) orally 2 times a day  multivitamins: Apply topically to affected area once a day  nature made cholestoff plus: 1  orally once a day  oxyCODONE 5 mg oral tablet: 1 tab(s) orally every 6 hours, As Needed -for moderate pain MDD:4   pregabalin 150 mg oral capsule: 1 cap(s) orally 4 times a day  prostate plus: 1  orally once a day  silver sulfADIAZINE 1% topical cream: Apply topically to affected area 2 times a day  trunature cranberry: 650  orally once a day  Vitamin B12 1000 mcg oral tablet: 1 tab(s) orally once a day  Vitamin D3 2000 intl units (50 mcg) oral tablet: 1 tab(s) orally once a day

## 2020-02-28 NOTE — DISCHARGE NOTE PROVIDER - NSDCFUADDINST_GEN_ALL_CORE_FT
drink plenty of fluids  use stool softeners and laxatives as needed  walk 2 x per day for at least 30 min.  for ant issues or questions call Dr. Strange's office

## 2020-02-28 NOTE — PROGRESS NOTE ADULT - ASSESSMENT
53 y/o m s/p open right radical nephrectomy    A) Stable POD # 2    P) Dulcolax suppository  d/c home later today  op f/u with Dr. Strange next week  will d/c with attending

## 2020-03-03 DIAGNOSIS — C64.1 MALIGNANT NEOPLASM OF RIGHT KIDNEY, EXCEPT RENAL PELVIS: ICD-10-CM

## 2020-03-04 LAB — SURGICAL PATHOLOGY STUDY: SIGNIFICANT CHANGE UP

## 2020-03-12 ENCOUNTER — APPOINTMENT (OUTPATIENT)
Dept: UROLOGY | Facility: CLINIC | Age: 55
End: 2020-03-12
Payer: COMMERCIAL

## 2020-03-12 PROCEDURE — 99024 POSTOP FOLLOW-UP VISIT: CPT

## 2020-03-24 NOTE — HISTORY OF PRESENT ILLNESS
[FreeTextEntry1] : 55 yo with right renal tumor\par \par pathology reviewed\par \par tubulocystic renal carcinoma\par \par doing well \par \par no complaints  [Urinary Frequency] : urinary frequency [Weak Stream] : weak stream

## 2020-03-24 NOTE — LETTER BODY
[Dear  ___] : Dear  [unfilled], [Consult Letter:] : I had the pleasure of evaluating your patient, [unfilled]. [Please see my note below.] : Please see my note below. [Sincerely,] : Sincerely, [FreeTextEntry3] : Lanre Strange MD, FACS\par

## 2020-03-24 NOTE — ASSESSMENT
[FreeTextEntry1] : 55 yo with tubulocystic renal cell carcinoma\par \par discussed pathology in detail\par reviewed the surveillance protocol\par \par f/u in 6 months with US

## 2020-03-24 NOTE — REVIEW OF SYSTEMS
[Feeling Poorly] : not feeling poorly [Feeling Tired] : not feeling tired [Recent Weight Gain (___ Lbs)] : no recent weight gain [see HPI] : see HPI [Arthralgias] : arthralgias [Limb Swelling] : no limb swelling [Limb Weakness] : limb weakness [Negative] : Heme/Lymph

## 2020-03-24 NOTE — PHYSICAL EXAM
[General Appearance - Well Developed] : well developed [General Appearance - Well Nourished] : well nourished [Normal Appearance] : normal appearance [Well Groomed] : well groomed [General Appearance - In No Acute Distress] : no acute distress [Abdomen Soft] : soft [Abdomen Tenderness] : non-tender [Costovertebral Angle Tenderness] : no ~M costovertebral angle tenderness [FreeTextEntry1] : well healed right flank incision [Edema] : no peripheral edema [] : no respiratory distress [Respiration, Rhythm And Depth] : normal respiratory rhythm and effort [Exaggerated Use Of Accessory Muscles For Inspiration] : no accessory muscle use [Oriented To Time, Place, And Person] : oriented to person, place, and time [Affect] : the affect was normal [Mood] : the mood was normal [Not Anxious] : not anxious [Normal Station and Gait] : the gait and station were normal for the patient's age [No Focal Deficits] : no focal deficits [No Palpable Adenopathy] : no palpable adenopathy

## 2020-06-25 ENCOUNTER — INPATIENT (INPATIENT)
Facility: HOSPITAL | Age: 55
LOS: 7 days | Discharge: HOME HEALTH | End: 2020-07-03
Attending: INTERNAL MEDICINE | Admitting: INTERNAL MEDICINE
Payer: COMMERCIAL

## 2020-06-25 VITALS
OXYGEN SATURATION: 97 % | HEART RATE: 98 BPM | SYSTOLIC BLOOD PRESSURE: 129 MMHG | RESPIRATION RATE: 18 BRPM | DIASTOLIC BLOOD PRESSURE: 87 MMHG | TEMPERATURE: 97 F

## 2020-06-25 DIAGNOSIS — Z90.5 ACQUIRED ABSENCE OF KIDNEY: Chronic | ICD-10-CM

## 2020-06-25 LAB
ALBUMIN SERPL ELPH-MCNC: 4.1 G/DL — SIGNIFICANT CHANGE UP (ref 3.5–5.2)
ALP SERPL-CCNC: 99 U/L — SIGNIFICANT CHANGE UP (ref 30–115)
ALT FLD-CCNC: 31 U/L — SIGNIFICANT CHANGE UP (ref 0–41)
ANION GAP SERPL CALC-SCNC: 9 MMOL/L — SIGNIFICANT CHANGE UP (ref 7–14)
APTT BLD: 30.7 SEC — SIGNIFICANT CHANGE UP (ref 27–39.2)
AST SERPL-CCNC: 21 U/L — SIGNIFICANT CHANGE UP (ref 0–41)
BASE EXCESS BLDV CALC-SCNC: 3.2 MMOL/L — HIGH (ref -2–2)
BASOPHILS # BLD AUTO: 0.03 K/UL — SIGNIFICANT CHANGE UP (ref 0–0.2)
BASOPHILS NFR BLD AUTO: 0.4 % — SIGNIFICANT CHANGE UP (ref 0–1)
BILIRUB SERPL-MCNC: 0.5 MG/DL — SIGNIFICANT CHANGE UP (ref 0.2–1.2)
BUN SERPL-MCNC: 15 MG/DL — SIGNIFICANT CHANGE UP (ref 10–20)
CA-I SERPL-SCNC: 1.15 MMOL/L — SIGNIFICANT CHANGE UP (ref 1.12–1.3)
CALCIUM SERPL-MCNC: 9.1 MG/DL — SIGNIFICANT CHANGE UP (ref 8.5–10.1)
CHLORIDE SERPL-SCNC: 98 MMOL/L — SIGNIFICANT CHANGE UP (ref 98–110)
CO2 SERPL-SCNC: 27 MMOL/L — SIGNIFICANT CHANGE UP (ref 17–32)
CREAT SERPL-MCNC: 1.3 MG/DL — SIGNIFICANT CHANGE UP (ref 0.7–1.5)
EOSINOPHIL # BLD AUTO: 0.13 K/UL — SIGNIFICANT CHANGE UP (ref 0–0.7)
EOSINOPHIL NFR BLD AUTO: 1.8 % — SIGNIFICANT CHANGE UP (ref 0–8)
GAS PNL BLDV: 136 MMOL/L — SIGNIFICANT CHANGE UP (ref 136–145)
GAS PNL BLDV: SIGNIFICANT CHANGE UP
GLUCOSE SERPL-MCNC: 142 MG/DL — HIGH (ref 70–99)
HCO3 BLDV-SCNC: 30 MMOL/L — HIGH (ref 22–29)
HCT VFR BLD CALC: 39.4 % — LOW (ref 42–52)
HCT VFR BLDA CALC: 60.4 % — HIGH (ref 34–44)
HGB BLD CALC-MCNC: 19.7 G/DL — HIGH (ref 14–18)
HGB BLD-MCNC: 13.3 G/DL — LOW (ref 14–18)
IMM GRANULOCYTES NFR BLD AUTO: 0.4 % — HIGH (ref 0.1–0.3)
INR BLD: 1.1 RATIO — SIGNIFICANT CHANGE UP (ref 0.65–1.3)
LACTATE BLDV-MCNC: 1 MMOL/L — SIGNIFICANT CHANGE UP (ref 0.5–1.6)
LYMPHOCYTES # BLD AUTO: 1.22 K/UL — SIGNIFICANT CHANGE UP (ref 1.2–3.4)
LYMPHOCYTES # BLD AUTO: 16.6 % — LOW (ref 20.5–51.1)
MCHC RBC-ENTMCNC: 28.5 PG — SIGNIFICANT CHANGE UP (ref 27–31)
MCHC RBC-ENTMCNC: 33.8 G/DL — SIGNIFICANT CHANGE UP (ref 32–37)
MCV RBC AUTO: 84.4 FL — SIGNIFICANT CHANGE UP (ref 80–94)
MONOCYTES # BLD AUTO: 0.72 K/UL — HIGH (ref 0.1–0.6)
MONOCYTES NFR BLD AUTO: 9.8 % — HIGH (ref 1.7–9.3)
NEUTROPHILS # BLD AUTO: 5.22 K/UL — SIGNIFICANT CHANGE UP (ref 1.4–6.5)
NEUTROPHILS NFR BLD AUTO: 71 % — SIGNIFICANT CHANGE UP (ref 42.2–75.2)
NRBC # BLD: 0 /100 WBCS — SIGNIFICANT CHANGE UP (ref 0–0)
PCO2 BLDV: 53 MMHG — HIGH (ref 41–51)
PH BLDV: 7.37 — SIGNIFICANT CHANGE UP (ref 7.26–7.43)
PLATELET # BLD AUTO: 232 K/UL — SIGNIFICANT CHANGE UP (ref 130–400)
PO2 BLDV: 19 MMHG — LOW (ref 20–40)
POTASSIUM BLDV-SCNC: 4.1 MMOL/L — SIGNIFICANT CHANGE UP (ref 3.3–5.6)
POTASSIUM SERPL-MCNC: 4.4 MMOL/L — SIGNIFICANT CHANGE UP (ref 3.5–5)
POTASSIUM SERPL-SCNC: 4.4 MMOL/L — SIGNIFICANT CHANGE UP (ref 3.5–5)
PROT SERPL-MCNC: 7.1 G/DL — SIGNIFICANT CHANGE UP (ref 6–8)
PROTHROM AB SERPL-ACNC: 12.6 SEC — SIGNIFICANT CHANGE UP (ref 9.95–12.87)
RBC # BLD: 4.67 M/UL — LOW (ref 4.7–6.1)
RBC # FLD: 13.5 % — SIGNIFICANT CHANGE UP (ref 11.5–14.5)
SAO2 % BLDV: 29 % — SIGNIFICANT CHANGE UP
SODIUM SERPL-SCNC: 134 MMOL/L — LOW (ref 135–146)
WBC # BLD: 7.35 K/UL — SIGNIFICANT CHANGE UP (ref 4.8–10.8)
WBC # FLD AUTO: 7.35 K/UL — SIGNIFICANT CHANGE UP (ref 4.8–10.8)

## 2020-06-25 PROCEDURE — 93925 LOWER EXTREMITY STUDY: CPT | Mod: 26

## 2020-06-25 PROCEDURE — 99284 EMERGENCY DEPT VISIT MOD MDM: CPT

## 2020-06-25 PROCEDURE — 71045 X-RAY EXAM CHEST 1 VIEW: CPT | Mod: 26

## 2020-06-25 PROCEDURE — 93010 ELECTROCARDIOGRAM REPORT: CPT

## 2020-06-25 PROCEDURE — 99221 1ST HOSP IP/OBS SF/LOW 40: CPT

## 2020-06-25 RX ORDER — LISINOPRIL 2.5 MG/1
10 TABLET ORAL DAILY
Refills: 0 | Status: DISCONTINUED | OUTPATIENT
Start: 2020-06-25 | End: 2020-06-29

## 2020-06-25 RX ORDER — CEFEPIME 1 G/1
1000 INJECTION, POWDER, FOR SOLUTION INTRAMUSCULAR; INTRAVENOUS EVERY 8 HOURS
Refills: 0 | Status: DISCONTINUED | OUTPATIENT
Start: 2020-06-25 | End: 2020-06-26

## 2020-06-25 RX ORDER — METRONIDAZOLE 500 MG
500 TABLET ORAL EVERY 8 HOURS
Refills: 0 | Status: DISCONTINUED | OUTPATIENT
Start: 2020-06-25 | End: 2020-06-26

## 2020-06-25 RX ORDER — VANCOMYCIN HCL 1 G
1000 VIAL (EA) INTRAVENOUS EVERY 12 HOURS
Refills: 0 | Status: DISCONTINUED | OUTPATIENT
Start: 2020-06-25 | End: 2020-06-26

## 2020-06-25 RX ORDER — METRONIDAZOLE 500 MG
500 TABLET ORAL ONCE
Refills: 0 | Status: COMPLETED | OUTPATIENT
Start: 2020-06-25 | End: 2020-06-25

## 2020-06-25 RX ORDER — CHOLECALCIFEROL (VITAMIN D3) 125 MCG
1 CAPSULE ORAL
Qty: 0 | Refills: 0 | DISCHARGE

## 2020-06-25 RX ORDER — ENOXAPARIN SODIUM 100 MG/ML
40 INJECTION SUBCUTANEOUS DAILY
Refills: 0 | Status: DISCONTINUED | OUTPATIENT
Start: 2020-06-25 | End: 2020-06-29

## 2020-06-25 RX ORDER — CEFEPIME 1 G/1
2000 INJECTION, POWDER, FOR SOLUTION INTRAMUSCULAR; INTRAVENOUS ONCE
Refills: 0 | Status: COMPLETED | OUTPATIENT
Start: 2020-06-25 | End: 2020-06-25

## 2020-06-25 RX ORDER — PREGABALIN 225 MG/1
1 CAPSULE ORAL
Qty: 0 | Refills: 0 | DISCHARGE

## 2020-06-25 RX ORDER — VANCOMYCIN HCL 1 G
1000 VIAL (EA) INTRAVENOUS ONCE
Refills: 0 | Status: COMPLETED | OUTPATIENT
Start: 2020-06-25 | End: 2020-06-25

## 2020-06-25 RX ORDER — AMLODIPINE BESYLATE 2.5 MG/1
5 TABLET ORAL DAILY
Refills: 0 | Status: DISCONTINUED | OUTPATIENT
Start: 2020-06-25 | End: 2020-06-29

## 2020-06-25 RX ORDER — SODIUM CHLORIDE 9 MG/ML
1000 INJECTION INTRAMUSCULAR; INTRAVENOUS; SUBCUTANEOUS ONCE
Refills: 0 | Status: COMPLETED | OUTPATIENT
Start: 2020-06-25 | End: 2020-06-25

## 2020-06-25 RX ORDER — DULOXETINE HYDROCHLORIDE 30 MG/1
1 CAPSULE, DELAYED RELEASE ORAL
Qty: 0 | Refills: 0 | DISCHARGE

## 2020-06-25 RX ADMIN — CEFEPIME 100 MILLIGRAM(S): 1 INJECTION, POWDER, FOR SOLUTION INTRAMUSCULAR; INTRAVENOUS at 21:32

## 2020-06-25 RX ADMIN — Medication 250 MILLIGRAM(S): at 11:21

## 2020-06-25 RX ADMIN — SODIUM CHLORIDE 1000 MILLILITER(S): 9 INJECTION INTRAMUSCULAR; INTRAVENOUS; SUBCUTANEOUS at 11:21

## 2020-06-25 RX ADMIN — Medication 100 MILLIGRAM(S): at 21:03

## 2020-06-25 RX ADMIN — CEFEPIME 100 MILLIGRAM(S): 1 INJECTION, POWDER, FOR SOLUTION INTRAMUSCULAR; INTRAVENOUS at 11:22

## 2020-06-25 RX ADMIN — Medication 100 MILLIGRAM(S): at 11:22

## 2020-06-25 NOTE — CONSULT NOTE ADULT - SUBJECTIVE AND OBJECTIVE BOX
Podiatry Consult Note    Subjective:  RAD PARKS is a pleasant well-nourished, well developed 54y Male in no acute distress, alert awake, and oriented to person, place and time.   Patient is a 54y old  Male who presents with a chief complaint of foot ulcer, peripheral neuropathy (25 Jun 2020 12:34)    HPI:      Past Medical History and Surgical History  PAST MEDICAL & SURGICAL HISTORY:  Back pain with sciatica  Back pain  Numbness: legs  Umbilical hernia  Mild HTN  Sleep apnea: possible un diagnosed  PVD (peripheral vascular disease)  Peripheral neuropathy  Obesity  No significant past surgical history       Review of Systems:   Constitutional: No weakness, fevers or chills  Eyes / ENT: No visual changes; No vertigo or throat pain   Neck: No pain or stiffness  Respiratory: No cough, wheezing, hemoptysis; No shortness of breath  Cardiovascular: No chest pain or palpitations  Gastrointestinal: No abdominal or epigastric pain. No nausea, vomiting, or hematemesis; No diarrhea or constipation. No melena or hematochezia.  Genitourinary: No dysuria, frequency or hematuria  Neurological: No numbness or weakness  Skin:    Objective:  Vital Signs Last 24 Hrs  T(C): 36.2 (25 Jun 2020 09:55), Max: 36.2 (25 Jun 2020 09:55)  T(F): 97.2 (25 Jun 2020 09:55), Max: 97.2 (25 Jun 2020 09:55)  HR: 98 (25 Jun 2020 09:55) (98 - 98)  BP: 129/87 (25 Jun 2020 09:55) (129/87 - 129/87)  BP(mean): --  RR: 18 (25 Jun 2020 09:55) (18 - 18)  SpO2: 97% (25 Jun 2020 09:55) (97% - 97%)                        13.3   7.35  )-----------( 232      ( 25 Jun 2020 10:50 )             39.4                 06-25    134<L>  |  98  |  15  ----------------------------<  142<H>  4.4   |  27  |  1.3    Ca    9.1      25 Jun 2020 10:50    TPro  7.1  /  Alb  4.1  /  TBili  0.5  /  DBili  x   /  AST  21  /  ALT  31  /  AlkPhos  99  06-25    Physical Exam - Lower Extremity Focused:   Derm:   Open Wound to the lateral aspect of 5th metatarsal head, plantar   Probes to deep capsule; With undermining at the 6 o'clock position   Mild serous drainage noted, No malodor noted  Fibrogranular wound base, Mildly macerated wound edges    Open wound to plantar aspect of 5th metatarsal head   Hyperkeratotic periwound mild serous sanguinous drainage  No malodor    Vascular: DP and PT Pulses Diminished; Foot is Warm to Warm to the touch   Neuro: Protective Sensation Diminished  MSK: No Pain On Palpation at Wound Site     Assessment:  OM of 5th Metatarsal Head, B/L  Cellulitic Left Midfoot w/ulcer at plantar aspect of 5th metatarsal  Open plantar wound at plantar aspect of 5th metatarsal      Plan:  Chart reviewed and Patient evaluated. All Questions and Concerns Addressed and Answered  Discussed diagnosis and treatment with patient  Local Wound Care; Betadine soaked gauze / kerlix; Boot to Left foot; Xeroform, betadine/ gauze / kerlix to right foot  Wt. bearing as tolerated with heel touch; with surgical shoe Left foot   Wound Culture Obtained; Sent to Pathology; Pending Results  MRI B/L foot taken on 6/15/20 as outpatient confirms OM of 5th metatarsal heads B/L   Recommend; B/L Foot Xrays   Continue local wound care; Will f/u with plan post Arterial Duplex; If abnormal, please consult Vascular  Will f/u with Dr. Saldaña for further planning     Podiatry  Podiatry Consult Note    Subjective:  RAD PARKS is a pleasant well-nourished, well developed 54y Male in no acute distress, alert awake, and oriented to person, place and time.   Patient is a 54y old  Male who presents with a chief complaint of foot ulcer, peripheral neuropathy (25 Jun 2020 12:34)    HPI:  Past Medical History and Surgical History  PAST MEDICAL & SURGICAL HISTORY:  Back pain with sciatica  Back pain  Numbness: legs  Umbilical hernia  Mild HTN  Sleep apnea: possible un diagnosed  PVD (peripheral vascular disease)  Peripheral neuropathy  Obesity  No significant past surgical history    Objective:  Vital Signs Last 24 Hrs  T(C): 36.2 (25 Jun 2020 09:55), Max: 36.2 (25 Jun 2020 09:55)  T(F): 97.2 (25 Jun 2020 09:55), Max: 97.2 (25 Jun 2020 09:55)  HR: 98 (25 Jun 2020 09:55) (98 - 98)  BP: 129/87 (25 Jun 2020 09:55) (129/87 - 129/87)  BP(mean): --  RR: 18 (25 Jun 2020 09:55) (18 - 18)  SpO2: 97% (25 Jun 2020 09:55) (97% - 97%)                        13.3   7.35  )-----------( 232      ( 25 Jun 2020 10:50 )             39.4                 06-25    134<L>  |  98  |  15  ----------------------------<  142<H>  4.4   |  27  |  1.3    Ca    9.1      25 Jun 2020 10:50    TPro  7.1  /  Alb  4.1  /  TBili  0.5  /  DBili  x   /  AST  21  /  ALT  31  /  AlkPhos  99  06-25    Physical Exam - Lower Extremity Focused:   Derm:   Open Wound to the lateral aspect of 5th metatarsal head, plantar   Probes to deep capsule; With undermining at the 6 o'clock position   Mild serous drainage noted, No malodor noted  Fibrogranular wound base, Mildly macerated wound edges    Open wound to plantar aspect of 5th metatarsal head   Hyperkeratotic periwound mild serous sanguinous drainage  No malodor    Vascular: DP and PT Pulses Diminished; Foot is Warm to Warm to the touch   Neuro: Protective Sensation Diminished  MSK: No Pain On Palpation at Wound Site     Assessment:  OM of 5th Metatarsal Head, B/L  Cellulitic Left Midfoot w/ulcer at plantar aspect of 5th metatarsal  Open plantar wound at plantar aspect of 5th metatarsal      Plan:  Chart reviewed and Patient evaluated. All Questions and Concerns Addressed and Answered  Discussed diagnosis and treatment with patient  Local Wound Care; Betadine soaked gauze / ABD/ kerlix; Q24 dressing change  Wt. bearing as tolerated with heel touch; with surgical shoe Left foot   Wound Culture Obtained; Sent to Pathology; Pending Results  MRI B/L foot taken on 6/15/20 as outpatient confirms OM of 5th metatarsal heads B/L   Recommend; B/L Foot Xrays   Continue local wound care; Will f/u with plan post Arterial Duplex; If abnormal, please consult Vascular  Will f/u with Dr. Saldaña for further planning     Podiatry  Podiatry Consult Note    Subjective:  RAD PARKS is a pleasant well-nourished, well developed 54y Male in no acute distress, alert awake, and oriented to person, place and time.   Patient is a 54y old  Male who presents with a chief complaint of foot ulcer, peripheral neuropathy (25 Jun 2020 12:34)    HPI:  Past Medical History and Surgical History  PAST MEDICAL & SURGICAL HISTORY:  Back pain with sciatica  Back pain  Numbness: legs  Umbilical hernia  Mild HTN  Sleep apnea: possible un diagnosed  PVD (peripheral vascular disease)  Peripheral neuropathy  Obesity  No significant past surgical history    Objective:  Vital Signs Last 24 Hrs  T(C): 36.2 (25 Jun 2020 09:55), Max: 36.2 (25 Jun 2020 09:55)  T(F): 97.2 (25 Jun 2020 09:55), Max: 97.2 (25 Jun 2020 09:55)  HR: 98 (25 Jun 2020 09:55) (98 - 98)  BP: 129/87 (25 Jun 2020 09:55) (129/87 - 129/87)  BP(mean): --  RR: 18 (25 Jun 2020 09:55) (18 - 18)  SpO2: 97% (25 Jun 2020 09:55) (97% - 97%)                        13.3   7.35  )-----------( 232      ( 25 Jun 2020 10:50 )             39.4                 06-25    134<L>  |  98  |  15  ----------------------------<  142<H>  4.4   |  27  |  1.3    Ca    9.1      25 Jun 2020 10:50    TPro  7.1  /  Alb  4.1  /  TBili  0.5  /  DBili  x   /  AST  21  /  ALT  31  /  AlkPhos  99  06-25    Physical Exam - Lower Extremity Focused:   Derm:   Open Wound to the lateral aspect of 5th metatarsal head, plantar   Probes to deep capsule; With undermining at the 6 o'clock position   Mild serous drainage noted, No malodor noted  Fibrogranular wound base, Mildly macerated wound edges    Open wound to plantar aspect of 5th metatarsal head   Hyperkeratotic periwound mild serous sanguinous drainage  No malodor    Vascular: DP and PT Pulses Diminished; Foot is Warm to Warm to the touch   Neuro: Protective Sensation Diminished  MSK: No Pain On Palpation at Wound Site     Assessment:  OM of 5th Metatarsal Head, B/L  Cellulitic Left Midfoot w/ulcer at plantar aspect of 5th metatarsal  Open plantar wound at plantar aspect of 5th metatarsal      Plan:  Chart reviewed and Patient evaluated. All Questions and Concerns Addressed and Answered  Discussed diagnosis and treatment with patient  Local Wound Care; Betadine soaked gauze / ABD/ kerlix; Q24 dressing change  Wt. bearing as tolerated with heel touch; with surgical shoe Left foot   Wound Culture Obtained; Sent to Pathology; Pending Results  MRI B/L foot taken on 6/15/20 as outpatient confirms OM of 5th metatarsal heads B/L   Recommend; B/L Foot Xrays   Will f/u with Dr. Saldaña for further planning     Podiatry  Podiatry Consult Note    Subjective:  RAD PARKS is a pleasant well-nourished, well developed 54y Male in no acute distress, alert awake, and oriented to person, place and time.   Patient is a 54y old  Male who presents with a chief complaint of foot ulcer, peripheral neuropathy (25 Jun 2020 12:34)    HPI:  Past Medical History and Surgical History  PAST MEDICAL & SURGICAL HISTORY:  Back pain with sciatica  Back pain  Numbness: legs  Umbilical hernia  Mild HTN  Sleep apnea: possible un diagnosed  PVD (peripheral vascular disease)  Peripheral neuropathy  Obesity  No significant past surgical history    Objective:  Vital Signs Last 24 Hrs  T(C): 36.2 (25 Jun 2020 09:55), Max: 36.2 (25 Jun 2020 09:55)  T(F): 97.2 (25 Jun 2020 09:55), Max: 97.2 (25 Jun 2020 09:55)  HR: 98 (25 Jun 2020 09:55) (98 - 98)  BP: 129/87 (25 Jun 2020 09:55) (129/87 - 129/87)  BP(mean): --  RR: 18 (25 Jun 2020 09:55) (18 - 18)  SpO2: 97% (25 Jun 2020 09:55) (97% - 97%)                        13.3   7.35  )-----------( 232      ( 25 Jun 2020 10:50 )             39.4                 06-25    134<L>  |  98  |  15  ----------------------------<  142<H>  4.4   |  27  |  1.3    Ca    9.1      25 Jun 2020 10:50    TPro  7.1  /  Alb  4.1  /  TBili  0.5  /  DBili  x   /  AST  21  /  ALT  31  /  AlkPhos  99  06-25    Physical Exam - Lower Extremity Focused:   Derm:   Open Wound to the lateral aspect of 5th metatarsal head, plantar   Probes to deep capsule; With undermining at the 6 o'clock position   Mild serous drainage noted, No malodor noted  Fibrogranular wound base, Mildly macerated wound edges    Open wound to plantar aspect of 5th metatarsal head   Hyperkeratotic periwound mild serous sanguinous drainage  No malodor    Vascular: DP and PT Pulses Diminished; Foot is Warm to Warm to the touch   Neuro: Protective Sensation Diminished  MSK: No Pain On Palpation at Wound Site     Assessment:  OM of 5th Metatarsal Head, B/L  Cellulitic Left Midfoot w/ulcer at plantar aspect of 5th metatarsal  Open plantar wound at plantar aspect of 5th metatarsal      Plan:  Chart reviewed and Patient evaluated. All Questions and Concerns Addressed and Answered  Discussed diagnosis and treatment with patient  Local Wound Care; Betadine soaked gauze / ABD/ kerlix; Q24 dressing change  Wt. bearing as tolerated with heel touch; with surgical shoe Left foot   Wound Culture Obtained; Sent to Pathology; Pending Results  MRI B/L foot taken on 6/15/20 as outpatient confirms OM of 5th metatarsal heads B/L   Recommend; B/L Foot Xrays   Spoke with patient regarding possible surgical debridement; Patient is aware and is open to debridement  Will follow up with Attending for surgical planning      Podiatry

## 2020-06-25 NOTE — H&P ADULT - NSHPPHYSICALEXAM_GEN_ALL_CORE
General: Well appearing, awake, alert, oriented to person, place, time/situation and in no apparent distress.  Cardiac: Normal rate, regular rhythm.  Heart sounds S1, S2.  No murmurs, rubs or gallops.  Resp: Breath sounds clear and equal bilaterally.  GI: Abdomen soft, tenderness to palpation on left lower quadrant, no guarding, (+) reducible umbilical hernia.  MSK: Spine appears normal, range of motion is not limited, no muscle or joint tenderness.  Neuro: Alert and oriented, no focal deficits, decreased sensation on b/l feet in the toes, ankles, and shins R>L.   Extremities:   Right foot:  (+) small stage 2 ulcer plantar surface foot base 5th metatarsal, [id number], (+) granulation tissue present, palpable pulses, capillary refill intact.    Left foot:  (+) deep [id number] stage 3 ulcer plantar surface foot base of fifth metatarsal with surrounding erythema and warmth that extends to dorsum foot, pedal pulses present, capillary refill intact.

## 2020-06-25 NOTE — ED PROVIDER NOTE - OBJECTIVE STATEMENT
54 y.o. male with a PMH of HTN, Right nephrectomy, and peripheral neuropathy presented to the ER c/o chronic ulcer to Left foot and Right foot.  States this has been an ongoing issue for the past 3 years.  Pt sent to ER by Dr. Saldaña for chronic osteo of the Right foot and acute osteo of the Left foot.  Pt denies diabetic hx, fever, chills, dizziness, SOB, fatigue.  Never seen by vascular.  No other complaints.

## 2020-06-25 NOTE — H&P ADULT - NSHPLABSRESULTS_GEN_ALL_CORE
MRI of Left Foot w/o IV Contrast (06/15/2020):   Impression: Ulcer along the lateral aspect of the fifth metatarsal head with diffuse cellulitis and myositis. Osteomyelitis of the fifth metatarsal bone as well as proximal phalanx of the fifth toe with suggestion of fifth MTP joint septic arthritis. Tenosynovitis of the flexor and extensor digitorum tendons.    MRI of Right Foot w/o IV contrast (06/15/2020):  Impression: Suggestion of chronic midfoot ulcer and chronic osteomyelitis of fifth metatarsal shaft. Mild cellulitis and myositis with chronic postinflammatory changes.    < from: [facility] Duplex Lower Extrem Arterial, Bilat (06.25.20 @ 11:18) >    Impression:    Mild atherosclerotic occlusive disease noted in the left tibial arteries.    < end of copied text >    Complete Blood Count + Automated Diff (06.25.20 @ 10:50)    WBC Count: 7.35 K/uL    RBC Count: 4.67 M/uL    Hemoglobin: 13.3 g/dL    Hematocrit: 39.4 %    Mean Cell Volume: 84.4 fL    Mean Cell Hemoglobin: 28.5 pg    Mean Cell Hemoglobin Conc: 33.8 g/dL    Red Cell Distrib Width: 13.5 %    Platelet Count - Automated: 232 K/uL    Auto Neutrophil #: 5.22 K/uL    Auto Lymphocyte #: 1.22 K/uL    Auto Monocyte #: 0.72 K/uL    Auto Eosinophil #: 0.13 K/uL    Auto Basophil #: 0.03 K/uL    Auto Neutrophil %: 71.0: Differential percentages must be correlated with absolute numbers for  clinical significance. %    Auto Lymphocyte %: 16.6 %    Auto Monocyte %: 9.8 %    Auto Eosinophil %: 1.8 %    Auto Basophil %: 0.4 %    Auto Immature Granulocyte %: 0.4 %    Nucleated RBC: 0 /100 WBCs    Basic Metabolic Panel (02.27.20 @ 05:03)    Sodium, Serum: 136 mmol/L    Potassium, Serum: 4.6 mmol/L    Chloride, Serum: 100 mmol/L    Carbon Dioxide, Serum: 27 mmol/L    Anion Gap, Serum: 9 mmol/L    Blood Urea Nitrogen, Serum: 14 mg/dL    Creatinine, Serum: 1.4 mg/dL    Glucose, Serum: 139 mg/dL    Calcium, Total Serum: 8.6 mg/dL    eGFR if Non : 56: Interpretative comment  The units for eGFR are mL/min/1.73M2 (normalized body surface area). The  eGFR is calculated from a serum creatinine using the CKD-EPI equation.  Other variables required for calculation are race, age and sex. Among  patients with chronic kidney disease (CKD), the eGFR is useful in  determining the stage of disease according to KDOQI CKD classification.  All eGFR results are reported numerically with the following  interpretation.          GFR                    With                 Without     (ml/min/1.73 m2)    Kidney Damage       Kidney Damage        >= 90                    Stage 1                     Normal        60-89                    Stage 2                     Decreased GFR        30-59     Stage 3                     Stage 3        15-29                    Stage 4                     Stage 4        < 15                      Stage 5                     Stage 5  Each stage of CKD assumes that the associated GFR level has been in  effect for at least 3 months. Determination of stages one and two (with  eGFR > 59 ml/min/m2) requires estimation of kidney damage for at least 3  months as defined by structural or functional abnormalities.  Limitations: All estimates of GFR will be less accurate for patients at  extremes of muscle mass (including but not limited to frail elderly,  critically ill, or cancer patients), those with unusual diets, and those  with conditions associated with reduced secretion or extrarenal  elimination of creatinine. The eGFR equation is not recommended for use  in patients with unstable creatinine levels. mL/min/1.73M2    eGFR if African American: 65 mL/min/1.73M2

## 2020-06-25 NOTE — ED ADULT TRIAGE NOTE - BP NONINVASIVE SYSTOLIC (MM HG)
This note was copied from the mother's chart.  Discharge today.  Discussed engorgement management.  Pt states pumping going well and getting nearly an ounce a breast.  Will follow up in clinic as needed.   129

## 2020-06-25 NOTE — H&P ADULT - HISTORY OF PRESENT ILLNESS
Pt is a 55 yo male w/ PMHx of HTN, right nephrectomy, sciatica presenting to the ED w/ chronic ulcers in b/l feet. Patient was seen earlier by podiatrist, Dr. Saldaña, who noticed the b/l foot ulcers getting worse, ordered MRI on 06/15 which showed Left 5th MTP bone osteomyelitis suggesting of septic arthritis and Right 5th MTP chronic osteomyelitis. Pt reports Left foot ulcer has started last year and has progressively getting worse over the last month with stabbing 08/10 pain, increase in pink drainage, and redness of the skin. Pt's chronic Right foot chronic ulcer was first noticed 3 years ago and pt has been previously hospitalized w/ abx for osteomyelitis. Pt denies any fevers, chills, CP, palpitations, SOB, n/v/d, burning on urination, hematuria, or weight changes.

## 2020-06-25 NOTE — H&P ADULT - NSICDXPASTMEDICALHX_GEN_ALL_CORE_FT
PAST MEDICAL HISTORY:  Back pain with sciatica     Mild HTN     Numbness legs    Obesity     Peripheral neuropathy     PVD (peripheral vascular disease)     Sleep apnea possible un diagnosed    Umbilical hernia

## 2020-06-25 NOTE — CONSULT NOTE ADULT - SUBJECTIVE AND OBJECTIVE BOX
VASCULAR SURGERY CONSULT NOTE      HPI: 53 yo male with non healing left foot ulcer x 1 year, +MRI for OM        PAST MEDICAL & SURGICAL HISTORY:  Back pain with sciatica  Back pain  Numbness: legs  Umbilical hernia  Mild HTN  Sleep apnea: possible un diagnosed  PVD (peripheral vascular disease)  Peripheral neuropathy  Obesity  No significant past surgical history    No Known Allergies    Home Medications:  acetaminophen 325 mg oral tablet: 2 tab(s) orally every 6 hours (28 Feb 2020 08:01)  amlodipine-benazepril 5 mg-10 mg oral capsule: 1 cap(s) orally once a day (26 Feb 2020 06:10)  CoQ10 300 mg oral capsule: 1 cap(s) orally once a day (26 Feb 2020 06:10)  DULoxetine 60 mg oral delayed release capsule: 1 cap(s) orally 2 times a day (26 Feb 2020 06:10)  multivitamins: Apply topically to affected area once a day (26 Feb 2020 06:10)  nature made cholestoff plus: 1  orally once a day (26 Feb 2020 06:10)  pregabalin 150 mg oral capsule: 1 cap(s) orally 4 times a day (26 Feb 2020 06:10)  prostate plus: 1  orally once a day (26 Feb 2020 06:10)  silver sulfADIAZINE 1% topical cream: Apply topically to affected area 2 times a day (26 Feb 2020 06:10)  trunature cranberry: 650  orally once a day (26 Feb 2020 06:10)  Vitamin B12 1000 mcg oral tablet: 1 tab(s) orally once a day (26 Feb 2020 06:10)  Vitamin D3 2000 intl units (50 mcg) oral tablet: 1 tab(s) orally once a day (26 Feb 2020 06:10)    No permtinent family history of PVD    REVIEW OF SYSTEMS:  GENERAL:                                         negative  SKIN:                                              see HPI  OPTHALMOLOGIC:                          negative  ENMT:                                               negative  RESPIRATORY AND THORAX:        negative  CARDIOVASCULAR:                         see HPI  GASTROINTESTINAL:                       negative  NEPHROLOGY:                                  negative  MUSCULOSKELETAL:                       negative  NEUROLOGIC:                                   see HPI  PSYCHIATRIC:                                    negative  HEMATOLOGY/LYMPHATICS:         negative  ENDOCRINE:                                     negative  ALLERGIC/IMMUNOLOGIC:            negative    12 point ROS otherwise normal except as stated in HPI    PHYSICAL EXAM  Vital Signs Last 24 Hrs  T(C): 36.2 (25 Jun 2020 09:55), Max: 36.2 (25 Jun 2020 09:55)  T(F): 97.2 (25 Jun 2020 09:55), Max: 97.2 (25 Jun 2020 09:55)  HR: 98 (25 Jun 2020 09:55) (98 - 98)  BP: 129/87 (25 Jun 2020 09:55) (129/87 - 129/87)  BP(mean): --  RR: 18 (25 Jun 2020 09:55) (18 - 18)  SpO2: 97% (25 Jun 2020 09:55) (97% - 97%)    Appearance: Normal	  HEENT:   Normal oral mucosa, PERRL, EOMI	  Neck: Supple, - JVD;   Cardiovascular: Normal S1 S2, No JVD, No murmurs,   Respiratory: Lungs clear to auscultation, No Rales, Rhonchi, Wheezing	  Gastrointestinal:  Soft, Non-tender, positive BS	  Skin: No rashes, No ecchymoses, No cyanosis  Extremities: Normal range of motion, No clubbing, cyanosis or edema  Left dorsal ulcer, mild drainage  Right dorsal small ulcerated lesion  Vascular: Peripheral pulses palpable 2+ bilaterally  Neurologic: Non-focal  Psychiatry: A & O x 3, Mood & affect appropriate      PULSES:  Femoral:  Popliteal:  Dorsal Pedal: palpable b/l  Posterior Tibial: palpable b/l  Capillary:    MEDICATIONS:   MEDICATIONS  (STANDING):    MEDICATIONS  (PRN):      LAB/STUDIES:                        13.3   7.35  )-----------( 232      ( 25 Jun 2020 10:50 )             39.4     06-25    134<L>  |  98  |  15  ----------------------------<  142<H>  4.4   |  27  |  1.3    Ca    9.1      25 Jun 2020 10:50    TPro  7.1  /  Alb  4.1  /  TBili  0.5  /  DBili  x   /  AST  21  /  ALT  31  /  AlkPhos  99  06-25    PT/INR - ( 25 Jun 2020 10:50 )   PT: 12.60 sec;   INR: 1.10 ratio         PTT - ( 25 Jun 2020 10:50 )  PTT:30.7 sec  LIVER FUNCTIONS - ( 25 Jun 2020 10:50 )  Alb: 4.1 g/dL / Pro: 7.1 g/dL / ALK PHOS: 99 U/L / ALT: 31 U/L / AST: 21 U/L / GGT: x               IMAGING:

## 2020-06-25 NOTE — ED PROVIDER NOTE - SKIN, MLM
Right foot:  (+) small ulcer plantar surface foot base 5th metatarsal, no d/c, (+) granulation tissue present, palpable pulses  Left foot:  (+) deep 2.5 cm ulcer plantar surface foot base of fifth metatarsal with surrounding erythema and warmth that extends to dorsum foot, no d/c, pedal pulses present

## 2020-06-25 NOTE — H&P ADULT - ATTENDING COMMENTS
I saw and evaluated the patient and Reviewed notes i agree with history .physical exam and medica decision making w following addition/exception/observation

## 2020-06-25 NOTE — H&P ADULT - NSICDXFAMILYHX_GEN_ALL_CORE_FT
FAMILY HISTORY:  Family history of multiple myeloma, Dad  FH: heart attack, mom (age 44)  FH: prostate cancer, dad  FH: pulmonary embolism, dad

## 2020-06-25 NOTE — ED PROVIDER NOTE - PROGRESS NOTE DETAILS
spoke to vascular, will consult on case; spoke to podiatry, will consult on case spoke to Dr. Mccoy, accepts admission Attending Note: I personally evaluated the patient. I reviewed the Physician Assistant’s note (as assigned above), and agree with the findings and plan except as documented in my note.   53 y/o M with PMH of peripheral neuropathy, non-diabetic, presents to ED c/o b/l feet ulcers, L>R. Pt had an MRI which confirmed osteomyelitis on L foot.  PE: Pt is non-toxic, well appearing. No respiratory distress. L large lateral foot ulcer 4x3cm, surrounding erythema. No crepitus. Small ulceration to the R plantar surface around 5th metatarsal.  Plan: Labs, imaging, vascular consult, podiatry consult, ABX.

## 2020-06-25 NOTE — H&P ADULT - ASSESSMENT
Pt is a 53 yo male w/ PMHx of HTN, PVD, right nephrectomy, sciatica presenting to the ED w/ worsening of chronic ulcers in b/l feet. Patient was seen earlier by podiatrist, Dr. Saldaña, who ordered MRI on 06/15 which showed Left 5th MTP bone osteomyelitis suggesting of septic arthritis and Right 5th MTP chronic osteomyelitis.    # Septic arthritis of Left 5th toe  - ID on board  - Podiatry following, possible surgery w/ Dr. Saldaña on 06/29  - HD stable  - Pt on Cefepine, Metronidazole, Vancomycin   - AM Labs ordered: BMP, CBC, CRP, Mg, ESR, Type & screen    # PVD  - Duplex on 06/25 showed stenosis in the arteries of right lower extremity and mild-to-moderate atherosclerotic occlusive disease noted in the left popliteal and posterior tibial arteries.  - Vascular surgery consulted f/u after surgery     # s/p Right Nephrectomy  - Renal function stable  - BUN 15 (6/25) Cr 1.3 (6/25) eGFR 61 (6/25)      # HTN  - BP stable  - Continue home BP meds, amlodipine 5mg  - Takes benzapril 10 mg at home will give lisinopril 10mg.     # Sciatica   - Pregabalin 150mg    DVT ppx: Enoxaparin 40mg  Diet: DASH, Sodium and Cholesterol restricted  Activity: Weight bearing as tolerated  Code: Full code

## 2020-06-25 NOTE — CONSULT NOTE ADULT - ASSESSMENT
55 yo male with non healing left foot ulcer x 1 year, +MRI for OM    Vascular surgery called to assess for suspected underlying PAD  Pulses are palpable, art dplx prelim negative    Please, call podiatry for a consult      SPECTRA [id number]

## 2020-06-25 NOTE — ED ADULT TRIAGE NOTE - CHIEF COMPLAINT QUOTE
Pt sent in by Dr. Saldaña for right chronic midfoot ulcer and chronic osteomyelitis of the fifth metatarsal shaft.  Pt also has left foot ulcer of the fifth metatarsal head with diffuse cellulitis and myositis.

## 2020-06-26 LAB
ANION GAP SERPL CALC-SCNC: 12 MMOL/L — SIGNIFICANT CHANGE UP (ref 7–14)
BASOPHILS # BLD AUTO: 0.02 K/UL — SIGNIFICANT CHANGE UP (ref 0–0.2)
BASOPHILS NFR BLD AUTO: 0.4 % — SIGNIFICANT CHANGE UP (ref 0–1)
BLD GP AB SCN SERPL QL: SIGNIFICANT CHANGE UP
BUN SERPL-MCNC: 15 MG/DL — SIGNIFICANT CHANGE UP (ref 10–20)
CALCIUM SERPL-MCNC: 8.8 MG/DL — SIGNIFICANT CHANGE UP (ref 8.5–10.1)
CHLORIDE SERPL-SCNC: 100 MMOL/L — SIGNIFICANT CHANGE UP (ref 98–110)
CO2 SERPL-SCNC: 24 MMOL/L — SIGNIFICANT CHANGE UP (ref 17–32)
CREAT SERPL-MCNC: 1.1 MG/DL — SIGNIFICANT CHANGE UP (ref 0.7–1.5)
CRP SERPL-MCNC: 7.29 MG/DL — HIGH (ref 0–0.4)
EOSINOPHIL # BLD AUTO: 0.22 K/UL — SIGNIFICANT CHANGE UP (ref 0–0.7)
EOSINOPHIL NFR BLD AUTO: 4.3 % — SIGNIFICANT CHANGE UP (ref 0–8)
ERYTHROCYTE [SEDIMENTATION RATE] IN BLOOD: 64 MM/HR — HIGH (ref 0–10)
GLUCOSE SERPL-MCNC: 170 MG/DL — HIGH (ref 70–99)
HCT VFR BLD CALC: 36.8 % — LOW (ref 42–52)
HCV AB S/CO SERPL IA: 0.04 COI — SIGNIFICANT CHANGE UP
HCV AB SERPL-IMP: SIGNIFICANT CHANGE UP
HGB BLD-MCNC: 12.3 G/DL — LOW (ref 14–18)
IMM GRANULOCYTES NFR BLD AUTO: 0.6 % — HIGH (ref 0.1–0.3)
LYMPHOCYTES # BLD AUTO: 1.27 K/UL — SIGNIFICANT CHANGE UP (ref 1.2–3.4)
LYMPHOCYTES # BLD AUTO: 24.8 % — SIGNIFICANT CHANGE UP (ref 20.5–51.1)
MAGNESIUM SERPL-MCNC: 2.1 MG/DL — SIGNIFICANT CHANGE UP (ref 1.8–2.4)
MCHC RBC-ENTMCNC: 28.1 PG — SIGNIFICANT CHANGE UP (ref 27–31)
MCHC RBC-ENTMCNC: 33.4 G/DL — SIGNIFICANT CHANGE UP (ref 32–37)
MCV RBC AUTO: 84.2 FL — SIGNIFICANT CHANGE UP (ref 80–94)
MONOCYTES # BLD AUTO: 0.63 K/UL — HIGH (ref 0.1–0.6)
MONOCYTES NFR BLD AUTO: 12.3 % — HIGH (ref 1.7–9.3)
NEUTROPHILS # BLD AUTO: 2.95 K/UL — SIGNIFICANT CHANGE UP (ref 1.4–6.5)
NEUTROPHILS NFR BLD AUTO: 57.6 % — SIGNIFICANT CHANGE UP (ref 42.2–75.2)
NRBC # BLD: 0 /100 WBCS — SIGNIFICANT CHANGE UP (ref 0–0)
PLATELET # BLD AUTO: 215 K/UL — SIGNIFICANT CHANGE UP (ref 130–400)
POTASSIUM SERPL-MCNC: 4.2 MMOL/L — SIGNIFICANT CHANGE UP (ref 3.5–5)
POTASSIUM SERPL-SCNC: 4.2 MMOL/L — SIGNIFICANT CHANGE UP (ref 3.5–5)
RBC # BLD: 4.37 M/UL — LOW (ref 4.7–6.1)
RBC # FLD: 13.6 % — SIGNIFICANT CHANGE UP (ref 11.5–14.5)
SARS-COV-2 RNA SPEC QL NAA+PROBE: SIGNIFICANT CHANGE UP
SODIUM SERPL-SCNC: 136 MMOL/L — SIGNIFICANT CHANGE UP (ref 135–146)
WBC # BLD: 5.12 K/UL — SIGNIFICANT CHANGE UP (ref 4.8–10.8)
WBC # FLD AUTO: 5.12 K/UL — SIGNIFICANT CHANGE UP (ref 4.8–10.8)

## 2020-06-26 PROCEDURE — 99231 SBSQ HOSP IP/OBS SF/LOW 25: CPT

## 2020-06-26 RX ORDER — CEFEPIME 1 G/1
2000 INJECTION, POWDER, FOR SOLUTION INTRAMUSCULAR; INTRAVENOUS EVERY 12 HOURS
Refills: 0 | Status: DISCONTINUED | OUTPATIENT
Start: 2020-06-26 | End: 2020-06-26

## 2020-06-26 RX ORDER — VANCOMYCIN HCL 1 G
1000 VIAL (EA) INTRAVENOUS EVERY 12 HOURS
Refills: 0 | Status: DISCONTINUED | OUTPATIENT
Start: 2020-06-26 | End: 2020-06-26

## 2020-06-26 RX ADMIN — CEFEPIME 100 MILLIGRAM(S): 1 INJECTION, POWDER, FOR SOLUTION INTRAMUSCULAR; INTRAVENOUS at 12:03

## 2020-06-26 RX ADMIN — Medication 150 MILLIGRAM(S): at 12:04

## 2020-06-26 RX ADMIN — AMLODIPINE BESYLATE 5 MILLIGRAM(S): 2.5 TABLET ORAL at 05:54

## 2020-06-26 RX ADMIN — CEFEPIME 100 MILLIGRAM(S): 1 INJECTION, POWDER, FOR SOLUTION INTRAMUSCULAR; INTRAVENOUS at 05:53

## 2020-06-26 RX ADMIN — LISINOPRIL 10 MILLIGRAM(S): 2.5 TABLET ORAL at 05:54

## 2020-06-26 RX ADMIN — Medication 100 MILLIGRAM(S): at 05:53

## 2020-06-26 RX ADMIN — Medication 250 MILLIGRAM(S): at 05:53

## 2020-06-26 RX ADMIN — ENOXAPARIN SODIUM 40 MILLIGRAM(S): 100 INJECTION SUBCUTANEOUS at 12:03

## 2020-06-26 NOTE — PROGRESS NOTE ADULT - SUBJECTIVE AND OBJECTIVE BOX
DAILY PROGRESS NOTE  ===========================================================    Patient Information:  RAD PARKS  /  54y  /  Male  /  MRN#: [id number]    Hospital Day: 1d     |:::::::::::::::::::::::::::| SUBJECTIVE |:::::::::::::::::::::::::::|    OVERNIGHT EVENTS: None  TODAY: Patient was seen today at bedside. Review of systems is otherwise negative. No complaints today    |:::::::::::::::::::::::::::| OBJECTIVE |:::::::::::::::::::::::::::|    VITAL SIGNS: Last 24 Hours  T(C): 35.8 (26 Jun 2020 13:14), Max: 36.7 (26 Jun 2020 02:30)  T(F): 96.4 (26 Jun 2020 13:14), Max: 98 (26 Jun 2020 02:30)  HR: 69 (26 Jun 2020 13:14) (69 - 84)  BP: 133/63 (26 Jun 2020 13:14) (101/57 - 133/63)  BP(mean): --  RR: 17 (26 Jun 2020 13:14) (17 - 18)  SpO2: 95% (26 Jun 2020 09:27) (95% - 98%)    PHYSICAL EXAM:  GENERAL:   Awake, alert; NAD.  HEENT:  Head NC/AT; Conjunctivae pink, Sclera anicteric; Oral mucosa moist.  CARDIO:   Regular rate; Regular rhythm; S1 & S2.  RESP:   No rales, wheezing, or rhonchi appreciated.  GI:   Soft; NT/ND; BS; No guarding; No rebound tenderness.  EXT:   Strength UE 5/5; Strength LE 5/5; No edema.   SKIN:   Intact. Ulcers on    LAB RESULTS:                        12.3   5.12  )-----------( 215      ( 26 Jun 2020 07:51 )             36.8     06-26    136  |  100  |  15  ----------------------------<  170<H>  4.2   |  24  |  1.1    Ca    8.8      26 Jun 2020 07:51  Mg     2.1     06-26    TPro  7.1  /  Alb  4.1  /  TBili  0.5  /  DBili  x   /  AST  21  /  ALT  31  /  AlkPhos  99  06-25    PT/INR - ( 25 Jun 2020 10:50 )   PT: 12.60 sec;   INR: 1.10 ratio         PTT - ( 25 Jun 2020 10:50 )  PTT:30.7 sec      Sedimentation Rate, Erythrocyte: 64 mm/Hr <H> (06-26-20 @ 07:51)        MICROBIOLOGY:    RADIOLOGY:    ALLERGIES: No Known Allergies      ===========================================================

## 2020-06-26 NOTE — CONSULT NOTE ADULT - ASSESSMENT
54 male with HTN, RCC s/p right nephrectomy, sciatica and bilateral LE neuropathy and chronic non-healing LE ulcers presenting for non-healing ulcer of the left foot. Found to have OM and he is scheduled for surgery on monday. Patient reports being evaluated by cardiology (Dr Eubanks) about 4 months ago prior to nephrectomy. Nuc. stress test was normal.   At baseline, functional status is poor due to LE neuropathy and chronic back pain; however, reports being able to walk up a flight of stairs without SOB or chest pain.   Risk factors: obesity, HTN, Family hx.    HTN  Sciatica  Peripheral neuropathy  Non-healing LE ulcers  OM  RCRI 0     Recommendations:   Please obtain records from patient's cardiologist   Moderate risk for MACE  Low risk surgery   Avoid hypotension

## 2020-06-26 NOTE — CONSULT NOTE ADULT - ASSESSMENT
ASSESSMENT  Patient is a 53 yo male w/ PMHx of Peripheral Neuropathy, HTN, right nephrectomy, sciatica presented to the ED w/ chronic ulcers in b/l feet.      IMPRESSION  # Chronic Nonhealing ulcer of B/L Foot    -   #  #    RECOMMENDATIONS  - f/u pending cultures  - ***    This is a pended note. All final recommendations to follow pending discussion with ID Attending ASSESSMENT  Patient is a 55 yo male w/ PMHx of Peripheral Neuropathy, HTN, right nephrectomy, sciatica presented to the ED w/ chronic ulcers in b/l feet.      IMPRESSION  # Osteomyelitis of Left 5th Metatarsal Bone with surrounding cellulitis and septic arthritis secondary to Chronic Nonhealing Ulcer on Plantar Surface of Left foot    - Patient should be on Vancomycin 1g q12, Cefepime 2g q12    - D/c Metronidazole    - F/u with Podiatry regarding surgery    - Check A1C for possible DM     # Chronic Osteomyelitis of Right 5th Metatarsal Bone secondary to Chronic Nonhealing Ulcer on Plantar surface of Right foot    - No recent changes    - Continue with the above mentioned Abx regime       RECOMMENDATIONS      This is a pended note. All final recommendations to follow pending discussion with ID Attending ASSESSMENT  Patient is a 55 yo male w/ PMHx of Peripheral Neuropathy, HTN, right nephrectomy, sciatica presented to the ED w/ chronic ulcers in b/l feet.      IMPRESSION  # Osteomyelitis of Left 5th Metatarsal Bone with surrounding cellulitis and septic arthritis secondary to Chronic Nonhealing Ulcer on Plantar Surface of Left foot    - Patient should be on Vancomycin 1g q12, Cefepime 2g q12    - D/c Metronidazole    - F/u with Podiatry regarding surgery    - Check A1C for possible DM     # Chronic Osteomyelitis of Right 5th Metatarsal Bone secondary to Chronic Nonhealing Ulcer on Plantar surface of Right foot    - No recent changes    - Continue with the above mentioned Abx regime         This is a pended note. All final recommendations to follow pending discussion with ID Attending ASSESSMENT  Patient is a 53 yo male w/ PMHx of Peripheral Neuropathy, HTN, right nephrectomy, sciatica presented to the ED w/ chronic ulcers in b/l feet.      IMPRESSION  # Osteomyelitis of Left 5th Metatarsal Bone with surrounding cellulitis and septic arthritis secondary to Chronic Nonhealing Ulcer on Plantar Surface of Left foot    - hold ABx till Sx    - F/u with Podiatry regarding surgery    - Check A1C for possible DM     # Chronic Osteomyelitis of Right 5th Metatarsal Bone secondary to Chronic Nonhealing Ulcer on Plantar surface of Right foot    - No recent changes

## 2020-06-26 NOTE — CONSULT NOTE ADULT - SUBJECTIVE AND OBJECTIVE BOX
Date of Admission: 6/25/2020    CHIEF COMPLAINT:    HISTORY OF PRESENT ILLNESS: Pt is a 55 yo male w/ PMHx of HTN, right nephrectomy, sciatica presenting to the ED w/ chronic ulcers in b/l feet. Patient was seen earlier by podiatrist, Dr. Saldaña, who noticed the b/l foot ulcers getting worse, ordered MRI on 06/15 which showed Left 5th MTP bone osteomyelitis suggesting of septic arthritis and Right 5th MTP chronic osteomyelitis. Pt reports Left foot ulcer has started last year and has progressively getting worse over the last month with stabbing 08/10 pain, increase in pink drainage, and redness of the skin. Pt's chronic Right foot chronic ulcer was first noticed 3 years ago and pt has been previously hospitalized w/ abx for osteomyelitis. Pt denies any fevers, chills, CP, palpitations, SOB, n/v/d, burning on urination, hematuria, or weight changes.     Cardiology:   54 male with HTN, RCC s/p right nephrectomy, sciatica and bilateral LE neuropathy and chronic non-healing LE uclers presenting for non-healing ulcer of the left foot. Found to have OM and he is scheduled for surgery on monday. Patient reports being evaluated by cardiology (Dr Eubanks) about 4 months ago prior to nephrectomy. Nuc. stress test was normal.   At baseline, functional status is poor due to LE neuropathy and chronic back pain; however, reports being able to walk up a flight of stairs without SOB or chest pain.       PAST MEDICAL & SURGICAL HISTORY:  Back pain with sciatica  Numbness: legs  Umbilical hernia  Mild HTN  Sleep apnea: possible un diagnosed  PVD (peripheral vascular disease)  Peripheral neuropathy  Obesity  History of right nephrectomy: 02/2020      FAMILY HISTORY:  [ ] no pertinent family history of premature cardiovascular disease in first degree relatives.  Mother: MI at 44   Father:   Siblings:     SOCIAL HISTORY:    [x] Non-smoker  [ ] Smoker  [ ] Alcohol    Allergies    No Known Allergies    Intolerances    	    REVIEW OF SYSTEMS:  CONSTITUTIONAL: denies fever, weight loss, or fatigue  CARDIOLOGY: see HPI  RESPIRATORY: denies shortness of breath, wheezing.   NEUROLOGICAL: denies weakness, no focal deficits to report.  GI: no BRBPR, no N,V, diarrhea.    PSYCHIATRY: normal mood and affect  HEENT: no nasal discharge, no ecchymosis  SKIN: see HPI  MUSCULOSKELETAL: Full range of motion x4.     PHYSICAL EXAM:  T(C): 35.8 (06-26-20 @ 13:14), Max: 36.7 (06-26-20 @ 02:30)  HR: 69 (06-26-20 @ 13:14) (69 - 84)  BP: 133/63 (06-26-20 @ 13:14) (101/57 - 133/63)  RR: 17 (06-26-20 @ 13:14) (17 - 18)  SpO2: 95% (06-26-20 @ 09:27) (95% - 98%)  Wt(kg): --  I&O's Summary      General Appearance: overweight middle-aged man 	  Neck: normal JVP, no bruit.   Eyes: No xanthomalasia, Extra Ocular muscles intact.   Cardiovascular: regular rate and rhythm S1 S2, No JVD, No murmurs, No edema  Respiratory: Lungs clear to auscultation	  Psychiatry: Alert and oriented x 3, Mood & affect appropriate  Gastrointestinal:  Soft, Non-tender  Skin/Integumen: No rashes, No ecchymoses, No cyanosis	  Neurologic: Non-focal  Musculoskeletal/extremities: multiple non-healing ulcers of bilateral feet.  Vascular: PT palpable 2+ bilaterally    LABS:	 	                          12.3   5.12  )-----------( 215      ( 26 Jun 2020 07:51 )             36.8     06-26    136  |  100  |  15  ----------------------------<  170<H>  4.2   |  24  |  1.1    Ca    8.8      26 Jun 2020 07:51  Mg     2.1     06-26    TPro  7.1  /  Alb  4.1  /  TBili  0.5  /  DBili  x   /  AST  21  /  ALT  31  /  AlkPhos  99  06-25        PT/INR - ( 25 Jun 2020 10:50 )   PT: 12.60 sec;   INR: 1.10 ratio         PTT - ( 25 Jun 2020 10:50 )  PTT:30.7 sec      TELEMETRY EVENTS: 	not on telemetry    ECG:  	Sinus rhythm LAD  RADIOLOGY:  < from: [facility] Duplex Lower Extrem Arterial, Bilat (06.25.20 @ 11:18) >  Impression:    Mild atherosclerotic occlusive disease noted in the left tibial arteries.    < end of copied text >    OTHER: 	    PREVIOUS DIAGNOSTIC TESTING:    [ ] Echocardiogram:  [ ] Catheterization:  [ ] Stress Test:  	  	    Home Medications:  acetaminophen 325 mg oral tablet: 2 tab(s) orally every 6 hours (28 Feb 2020 08:01)  amlodipine-benazepril 5 mg-10 mg oral capsule: 1 cap(s) orally once a day (26 Feb 2020 06:10)  CoQ10 300 mg oral capsule: 1 cap(s) orally once a day (26 Feb 2020 06:10)  pregabalin 150 mg oral capsule: 1 cap(s) orally 4 times a day (26 Feb 2020 06:10)  silver sulfADIAZINE 1% topical cream: Apply topically to affected area 2 times a day (26 Feb 2020 06:10)    MEDICATIONS  (STANDING):  amLODIPine   Tablet 5 milliGRAM(s) Oral daily  enoxaparin Injectable 40 milliGRAM(s) SubCutaneous daily  lisinopril 10 milliGRAM(s) Oral daily  pregabalin 150 milliGRAM(s) Oral daily    MEDICATIONS  (PRN): Date of Admission: 6/25/2020    CHIEF COMPLAINT:  Left foot pain    HISTORY OF PRESENT ILLNESS: Pt is a 53 yo male w/ PMHx of HTN, right nephrectomy, sciatica presenting to the ED w/ chronic ulcers in b/l feet. Patient was seen earlier by podiatrist, Dr. Saldaña, who noticed the b/l foot ulcers getting worse, ordered MRI on 06/15 which showed Left 5th MTP bone osteomyelitis suggesting of septic arthritis and Right 5th MTP chronic osteomyelitis. Pt reports Left foot ulcer has started last year and has progressively getting worse over the last month with stabbing 08/10 pain, increase in pink drainage, and redness of the skin. Pt's chronic Right foot chronic ulcer was first noticed 3 years ago and pt has been previously hospitalized w/ abx for osteomyelitis. Pt denies any fevers, chills, CP, palpitations, SOB, n/v/d, burning on urination, hematuria, or weight changes.     Cardiology:   54 male with HTN, RCC s/p right nephrectomy, sciatica and bilateral LE neuropathy and chronic non-healing LE ulcers presenting for non-healing ulcer of the left foot. Found to have OM and he is scheduled for surgery on monday. Patient reports being evaluated by cardiology (Dr Eubanks) about 4 months ago prior to nephrectomy. Nuc. stress test was normal.   At baseline, functional status is poor due to LE neuropathy and chronic back pain; however, reports being able to walk up a flight of stairs without SOB or chest pain.       PAST MEDICAL & SURGICAL HISTORY:  Back pain with sciatica  Numbness: legs  Umbilical hernia  Mild HTN  Sleep apnea: possible un diagnosed  PVD (peripheral vascular disease)  Peripheral neuropathy  Obesity  History of right nephrectomy: 02/2020      FAMILY HISTORY:  [ ] no pertinent family history of premature cardiovascular disease in first degree relatives.  Mother: MI at 44   Father:   Siblings:     SOCIAL HISTORY:    [x] Non-smoker  [ ] Smoker  [ ] Alcohol    Allergies  No Known Allergies    	  REVIEW OF SYSTEMS:  CONSTITUTIONAL: denies fever, weight loss, or fatigue  CARDIOLOGY: see HPI  RESPIRATORY: denies shortness of breath, wheezing.   NEUROLOGICAL: denies weakness, no focal deficits to report.  GI: no BRBPR, no N,V, diarrhea.    PSYCHIATRY: normal mood and affect  HEENT: no nasal discharge, no ecchymosis  SKIN: see HPI  MUSCULOSKELETAL: Full range of motion x4.     PHYSICAL EXAM:  T(C): 35.8 (06-26-20 @ 13:14), Max: 36.7 (06-26-20 @ 02:30)  HR: 69 (06-26-20 @ 13:14) (69 - 84)  BP: 133/63 (06-26-20 @ 13:14) (101/57 - 133/63)  RR: 17 (06-26-20 @ 13:14) (17 - 18)  SpO2: 95% (06-26-20 @ 09:27) (95% - 98%)  Wt(kg): --  I&O's Summary      General Appearance: overweight middle-aged man 	  Neck: normal JVP, no bruit.   Eyes: No xanthomalasia, Extra Ocular muscles intact.   Cardiovascular: regular rate and rhythm S1 S2, No JVD, No murmurs, No edema  Respiratory: Lungs clear to auscultation	  Psychiatry: Alert and oriented x 3, Mood & affect appropriate  Gastrointestinal:  Soft, Non-tender  Skin/Integumen: No rashes, No ecchymoses, No cyanosis	  Neurologic: Non-focal  Musculoskeletal/extremities: multiple non-healing ulcers of bilateral feet.  Vascular: PT palpable 2+ bilaterally      LABS:	 	                          12.3   5.12  )-----------( 215      ( 26 Jun 2020 07:51 )             36.8     06-26    136  |  100  |  15  ----------------------------<  170<H>  4.2   |  24  |  1.1    Ca    8.8      26 Jun 2020 07:51  Mg     2.1     06-26    TPro  7.1  /  Alb  4.1  /  TBili  0.5  /  DBili  x   /  AST  21  /  ALT  31  /  AlkPhos  99  06-25        PT/INR - ( 25 Jun 2020 10:50 )   PT: 12.60 sec;   INR: 1.10 ratio         PTT - ( 25 Jun 2020 10:50 )  PTT:30.7 sec        ECG:  	Sinus rhythm LAD    RADIOLOGY:  < from: [facility] Duplex Lower Extrem Arterial, Bilat (06.25.20 @ 11:18) >  Impression:    Mild atherosclerotic occlusive disease noted in the left tibial arteries.    < end of copied text >    	    Home Medications:  acetaminophen 325 mg oral tablet: 2 tab(s) orally every 6 hours (28 Feb 2020 08:01)  amlodipine-benazepril 5 mg-10 mg oral capsule: 1 cap(s) orally once a day (26 Feb 2020 06:10)  CoQ10 300 mg oral capsule: 1 cap(s) orally once a day (26 Feb 2020 06:10)  pregabalin 150 mg oral capsule: 1 cap(s) orally 4 times a day (26 Feb 2020 06:10)  silver sulfADIAZINE 1% topical cream: Apply topically to affected area 2 times a day (26 Feb 2020 06:10)    MEDICATIONS  (STANDING):  amLODIPine   Tablet 5 milliGRAM(s) Oral daily  enoxaparin Injectable 40 milliGRAM(s) SubCutaneous daily  lisinopril 10 milliGRAM(s) Oral daily  pregabalin 150 milliGRAM(s) Oral daily Date of Admission: 6/25/2020    CHIEF COMPLAINT:  Left foot pain    HISTORY OF PRESENT ILLNESS: Pt is a 53 yo male w/ PMHx of HTN, right nephrectomy, sciatica presenting to the ED w/ chronic ulcers in b/l feet. Patient was seen earlier by podiatrist, Dr. Saldaña, who noticed the b/l foot ulcers getting worse, ordered MRI on 06/15 which showed Left 5th MTP bone osteomyelitis suggesting of septic arthritis and Right 5th MTP chronic osteomyelitis. Pt reports Left foot ulcer has started last year and has progressively getting worse over the last month with stabbing 08/10 pain, increase in pink drainage, and redness of the skin. Pt's chronic Right foot chronic ulcer was first noticed 3 years ago and pt has been previously hospitalized w/ abx for osteomyelitis. Pt denies any fevers, chills, CP, palpitations, SOB, n/v/d, burning on urination, hematuria, or weight changes.     Cardiology:   54 male with HTN, RCC s/p right nephrectomy, sciatica and bilateral LE neuropathy and chronic non-healing LE ulcers presenting for non-healing ulcer of the left foot. Found to have OM and he is scheduled for surgery on monday. Patient reports being evaluated by cardiology (Dr Eubanks) about 4 months ago prior to nephrectomy. Nuc. stress test was normal.   At baseline, functional status is poor due to LE neuropathy and chronic back pain; however, reports being able to walk up a flight of stairs without SOB or chest pain.       PAST MEDICAL & SURGICAL HISTORY:  Back pain with sciatica  Numbness: legs  Umbilical hernia  Mild HTN  Sleep apnea: possible un diagnosed  PVD (peripheral vascular disease)  Peripheral neuropathy  Obesity  History of right nephrectomy: 02/2020      FAMILY HISTORY:  Mother: MI at 44   Father:   Siblings:     SOCIAL HISTORY:    [x] Non-smoker  [ ] Smoker  [-] Alcohol    Allergies  No Known Allergies    	  REVIEW OF SYSTEMS:  CONSTITUTIONAL: No fever, weight loss, fatigue  NECK: No pain or stiffness  RESPIRATORY: No cough, wheezing, shortness of breath  CARDIOVASCULAR: See HPI  GASTROINTESTINAL: No abdominal/epigastric pain, nausea, vomiting, hematemesis, diarrhea, constipation, melena or hematochezia  GENITOURINARY: No dysuria, frequency, hematuria, incontinence  NEUROLOGICAL: No headaches, memory loss, loss of strength, numbness, tremors  SKIN: See HPI   ENDOCRINE: No heat/cold intolerance or hair loss  MUSCULOSKELETAL: No joint pain or swelling  HEME/LYMPH: No easy bruising or bleeding gums     PHYSICAL EXAM:  T(C): 35.8 (06-26-20 @ 13:14), Max: 36.7 (06-26-20 @ 02:30)  HR: 69 (06-26-20 @ 13:14) (69 - 84)  BP: 133/63 (06-26-20 @ 13:14) (101/57 - 133/63)  RR: 17 (06-26-20 @ 13:14) (17 - 18)  SpO2: 95% (06-26-20 @ 09:27) (95% - 98%)  Wt(kg): --  I&O's Summary      General Appearance: overweight middle-aged man 	  Neck: normal JVP, no bruit.   Eyes: No xanthomalasia, Extra Ocular muscles intact.   Cardiovascular: regular rate and rhythm S1 S2, No JVD, No murmurs, No edema  Respiratory: Lungs clear to auscultation	  Psychiatry: Alert and oriented x 3, Mood & affect appropriate  Gastrointestinal:  Soft, Non-tender  Skin/Integumen: No rashes, No ecchymoses, No cyanosis	  Neurologic: Non-focal  Musculoskeletal/extremities: multiple non-healing ulcers of bilateral feet  Vascular: PT palpable 2+ bilaterally      LABS:	 	                          12.3   5.12  )-----------( 215      ( 26 Jun 2020 07:51 )             36.8     06-26    136  |  100  |  15  ----------------------------<  170<H>  4.2   |  24  |  1.1    Ca    8.8      26 Jun 2020 07:51  Mg     2.1     06-26    TPro  7.1  /  Alb  4.1  /  TBili  0.5  /  DBili  x   /  AST  21  /  ALT  31  /  AlkPhos  99  06-25      PT/INR - ( 25 Jun 2020 10:50 )   PT: 12.60 sec;   INR: 1.10 ratio      PTT - ( 25 Jun 2020 10:50 )  PTT:30.7 sec        ECG:  	Sinus rhythm LAD    RADIOLOGY:  < from: [facility] Duplex Lower Extrem Arterial, Bilat (06.25.20 @ 11:18) >  Impression:    Mild atherosclerotic occlusive disease noted in the left tibial arteries.    < end of copied text >    	    Home Medications:  acetaminophen 325 mg oral tablet: 2 tab(s) orally every 6 hours (28 Feb 2020 08:01)  amlodipine-benazepril 5 mg-10 mg oral capsule: 1 cap(s) orally once a day (26 Feb 2020 06:10)  CoQ10 300 mg oral capsule: 1 cap(s) orally once a day (26 Feb 2020 06:10)  pregabalin 150 mg oral capsule: 1 cap(s) orally 4 times a day (26 Feb 2020 06:10)  silver sulfADIAZINE 1% topical cream: Apply topically to affected area 2 times a day (26 Feb 2020 06:10)    MEDICATIONS  (STANDING):  amLODIPine   Tablet 5 milliGRAM(s) Oral daily  enoxaparin Injectable 40 milliGRAM(s) SubCutaneous daily  lisinopril 10 milliGRAM(s) Oral daily  pregabalin 150 milliGRAM(s) Oral daily

## 2020-06-26 NOTE — CONSULT NOTE ADULT - SUBJECTIVE AND OBJECTIVE BOX
RAD PARKS  54y, Male  Allergy: No Known Allergies      CHIEF COMPLAINT: Pt admitted for b/l foot ulcers w/ septic arthritis in fifth MTP on the left foot and chronic osteomyelitis of right fifth MTP on right foot. (26 Jun 2020 09:17)      HPI:    Patient is a 55 yo male w/ PMHx of Peripheral Neuropathy, HTN, right nephrectomy, sciatica presented to the ED w/ chronic ulcers in b/l feet.     Patient was followed as outpatient by podiatrist, Dr. Saldaña, who noticed that his b/l foot ulcers were getting worse, and had ordered MRI on 06/15 which showed Left 5th MTP bone osteomyelitis suggesting of septic arthritis and Right 5th MTP chronic osteomyelitis.       Pt reports Left foot ulcer has started last year from a callus present on the palm of his foot, and had progressively gotten worse over the last month with stabbing, 8/10 pain, increase in pink drainage, and redness of the skin. Pt's Right foot chronic ulcer was first noticed 3 years ago, and was a result of trauma from wearing boots. During this time pt had been hospitalized and treated w/ abx for osteomyelitis. He completed prolonged course of PO Abx as outpatient for about 1 month.  Patient continues to ambulate himself at home with mild discomfort. Pt denies any fevers, chills, CP, SOB, n/v/d, dysuria, hematuria, or weight changes.    FAMILY HISTORY:  Family history of multiple myeloma: Dad  FH: prostate cancer: dad  FH: pulmonary embolism: dad  FH: heart attack: mom (age 44)    PAST MEDICAL & SURGICAL HISTORY:  Back pain with sciatica  Numbness: legs  Umbilical hernia  Mild HTN  Sleep apnea: possible un diagnosed  PVD (peripheral vascular disease)  Peripheral neuropathy  Obesity  History of right nephrectomy: 02/2020      SOCIAL HISTORY  Social History:  Denies tobacco. Denies alcohol use. Denies recreational drug use. Works in construction as .       ROS  General: Denies rigors, nightsweats, chills  HEENT: Denies headache, rhinorrhea, sore throat, eye pain  CV: Denies CP, palpitations  PULM: Denies SOB, wheezing, hemoptysis  GI: Denies any changes in bowel movements, hematemesis, hematochezia, melena  : Denies dysuria, increased frequency, discharge, hematuria  MSK: Denies arthralgias, myalgias  SKIN: Denies rash, lesions  NEURO: Pins and needles sensation in his toes  PSYCH: Denies depression, anxiety    VITALS:  T(F): 97.9, Max: 98.4 (06-25-20 @ 15:53)  HR: 84  BP: 109/60  RR: 18Vital Signs Last 24 Hrs  T(C): 36.6 (26 Jun 2020 05:37), Max: 36.9 (25 Jun 2020 15:53)  T(F): 97.9 (26 Jun 2020 05:37), Max: 98.4 (25 Jun 2020 15:53)  HR: 84 (26 Jun 2020 05:37) (65 - 84)  BP: 109/60 (26 Jun 2020 05:37) (101/57 - 112/61)  BP(mean): --  RR: 18 (26 Jun 2020 02:30) (18 - 18)  SpO2: 95% (26 Jun 2020 09:27) (95% - 98%)    PHYSICAL EXAM:  Gen: Obese gentleman, appears his age, NAD, resting in bed  HEENT: Normocephalic, atraumatic  Neck: supple, no lymphadenopathy  CV: Regular rate & regular rhythm  Lungs: CTAB  Abdomen: Soft, BS present  Ext:   Neuro: non focal, awake, alert. Decreased sensation b/l in the toes  Skin: no rash, no lesions  Lines: no phlebitis    TESTS & MEASUREMENTS:                        12.3   5.12  )-----------( 215      ( 26 Jun 2020 07:51 )             36.8     06-26    136  |  100  |  15  ----------------------------<  170<H>  4.2   |  24  |  1.1    Ca    8.8      26 Jun 2020 07:51  Mg     2.1     06-26    TPro  7.1  /  Alb  4.1  /  TBili  0.5  /  DBili  x   /  AST  21  /  ALT  31  /  AlkPhos  99  06-25    eGFR if Non African American: 75 mL/min/1.73M2 (06-26-20 @ 07:51)  eGFR if : 87 mL/min/1.73M2 (06-26-20 @ 07:51)  eGFR if Non African American: 61 mL/min/1.73M2 (06-25-20 @ 10:50)  eGFR if : 71 mL/min/1.73M2 (06-25-20 @ 10:50)    LIVER FUNCTIONS - ( 25 Jun 2020 10:50 )  Alb: 4.1 g/dL / Pro: 7.1 g/dL / ALK PHOS: 99 U/L / ALT: 31 U/L / AST: 21 U/L / GGT: x           ESR 64        Blood Gas Venous - Lactate: 1.0 mmoL/L (06-25-20 @ 10:57)      INFECTIOUS DISEASES TESTING      RADIOLOGY & ADDITIONAL TESTS:  I have personally reviewed the last available Chest xray      Xray Chest 1 View- PORTABLE-Urgent:   EXAM:  XR CHEST PORTABLE URGENT 1V            PROCEDURE DATE:  06/25/2020      INTERPRETATION:  Clinical History / Reason for exam: cough    Comparison : Chest radiograph 2/12/2020    Technique/Positioning: Frontal view of the chest. [employer]view    Findings:    Support devices: None.    Cardiac/mediastinum/hilum: The cardiac silhouette is normal in size.    Lung parenchyma/Pleura: There is no focal consolidation, pleural effusion or pneumothorax.    Skeleton/soft tissues: Stable    Impression:      No evidence of focal consolidation, pleural effusion or pneumothorax.      JAILYN KOVACS M.D., ATTENDING RADIOLOGIST  This document has been electronically signed. Jun 25 2020  1:48PM     (06-25-20 @ 10:57)      [facility]: (06/25/20)    INTERPRETATION:  Clinical History / Reason for exam: This patient is a 54-year-old male with left lower extremity claudication pain.. Lower extremity arterial duplex ultrasound was performed to assess for arterial occlusive disease.    Bilateral common femoral, deep femoral, superficial femoral, popliteal, anterior tibial, posterior tibial and peroneal arteries were visualized and appeared patent.      Significant atherosclerotic occlusion or stenosis noted in the arteries of right lower extremity.    Mild-to-moderate atherosclerotic occlusive disease noted in the left popliteal and posterior tibial arteries with increased PSV of 1 39 cm/s in the left posterior tibial artery. There is spectral broadening and loss of triphasic waveforms noted in the above-mentioned arteries.    Enlarged lymph node noted in the left inguinal region measuring 3.2 x 2.8 x 0.9 cm.    Impression:    Mild atherosclerotic occlusive disease noted in the left tibial arteries.        CARDIOLOGY TESTING  12 Lead ECG:   Ventricular Rate 80 BPM    Atrial Rate 80 BPM    P-R Interval 160 ms    QRS Duration 90 ms    Q-T Interval 386 ms    QTC Calculation(Bezet) 445 ms    P Axis 49 degrees    R Axis -42 degrees    T Axis 63 degrees    Diagnosis Line Normal sinus rhythm  Left axis deviation  Abnormal ECG    Confirmed by Javy Pimentel ([id number]) on 6/25/2020 11:58:16 AM (06-25-20 @ 11:30)      All available historical records have been reviewed    MEDICATIONS  amLODIPine   Tablet 5  cefepime   IVPB 1000  enoxaparin Injectable 40  lisinopril 10  metroNIDAZOLE  IVPB 500  pregabalin 150  vancomycin  IVPB 1000      ANTIBIOTICS:  cefepime   IVPB 1000 milliGRAM(s) IV Intermittent every 8 hours  metroNIDAZOLE  IVPB 500 milliGRAM(s) IV Intermittent every 8 hours  vancomycin  IVPB 1000 milliGRAM(s) IV Intermittent every 12 hours      All available historical data has been reviewed RAD PARKS  54y, Male  Allergy: No Known Allergies      CHIEF COMPLAINT: Pt admitted for b/l foot ulcers w/ septic arthritis in fifth MTP on the left foot and chronic osteomyelitis of right fifth MTP on right foot. (26 Jun 2020 09:17)      HPI:    Patient is a 55 yo male w/ PMHx of Peripheral Neuropathy, HTN, right nephrectomy, sciatica presented to the ED w/ chronic ulcers in b/l feet. Patient was followed as outpatient by podiatrist, Dr. Saldaña, who noticed that his b/l foot ulcers were getting worse, and had ordered MRI on 06/15 which showed Left 5th MTP bone osteomyelitis suggesting of septic arthritis and Right 5th MTP chronic osteomyelitis.   Pt reports Left foot ulcer has started last year from a callus present on the palm of his foot, and had progressively gotten worse over the last month with stabbing, 8/10 pain, increase in pink drainage, and redness of the skin. Pt's Right foot chronic ulcer was first noticed 3 years ago, and was a result of trauma from wearing boots. During this time pt had been hospitalized and treated w/ abx for osteomyelitis. He completed prolonged course of PO Abx as outpatient for about 1 month.  Patient continues to ambulate himself at home with mild discomfort. Pt denies any fevers, chills, CP, SOB, n/v/d, dysuria, hematuria, or weight changes.    FAMILY HISTORY:  Family history of multiple myeloma: Dad  FH: prostate cancer: dad  FH: pulmonary embolism: dad  FH: heart attack: mom (age 44)    PAST MEDICAL & SURGICAL HISTORY:  Back pain with sciatica  Numbness: legs  Umbilical hernia  Mild HTN  Sleep apnea: possible un diagnosed  PVD (peripheral vascular disease)  Peripheral neuropathy  Obesity  History of right nephrectomy: 02/2020      SOCIAL HISTORY  Social History:  Denies tobacco. Denies alcohol use. Denies recreational drug use. Works in construction as .       ROS  General: Denies rigors, nightsweats, chills  HEENT: Denies headache, rhinorrhea, sore throat, eye pain  CV: Denies CP, palpitations  PULM: Denies SOB, wheezing, hemoptysis  GI: Denies any changes in bowel movements, hematemesis, hematochezia, melena  : Denies dysuria, increased frequency, discharge, hematuria  MSK: Denies arthralgias, myalgias  SKIN: Denies rash, lesions  NEURO: Pins and needles sensation in his toes  PSYCH: Denies depression, anxiety    VITALS:  T(F): 97.9, Max: 98.4 (06-25-20 @ 15:53)  HR: 84  BP: 109/60  RR: 18Vital Signs Last 24 Hrs  T(C): 36.6 (26 Jun 2020 05:37), Max: 36.9 (25 Jun 2020 15:53)  T(F): 97.9 (26 Jun 2020 05:37), Max: 98.4 (25 Jun 2020 15:53)  HR: 84 (26 Jun 2020 05:37) (65 - 84)  BP: 109/60 (26 Jun 2020 05:37) (101/57 - 112/61)  BP(mean): --  RR: 18 (26 Jun 2020 02:30) (18 - 18)  SpO2: 95% (26 Jun 2020 09:27) (95% - 98%)    PHYSICAL EXAM:  Gen: Obese gentleman, appears his age, NAD, resting in bed  HEENT: Normocephalic, atraumatic  Neck: supple, no lymphadenopathy  CV: Regular rate & regular rhythm  Lungs: CTAB  Abdomen: Soft, BS present  Right Ext: Small ulcer present on the plantar surface of foot near the lateral aspect of the 5th metatarsal; It is nontender, nonbleeding, no discharge. Granulation tissue present on the dorsum of the foot, palpable pulses  Left Ext: Large ulcer measuring approximately [id number] present on the plantar surface of foot near the 5th metatarsal with surrounding erythema and warmth that extends towards the dorsum of the foot, bone is palpable through the ulcer. Pulses are palpable.   Neuro: non focal, awake, alert. Decreased sensation b/l in the toes  Skin: no rash, no lesions  Lines: no phlebitis    TESTS & MEASUREMENTS:                        12.3   5.12  )-----------( 215      ( 26 Jun 2020 07:51 )             36.8     06-26    136  |  100  |  15  ----------------------------<  170<H>  4.2   |  24  |  1.1    Ca    8.8      26 Jun 2020 07:51  Mg     2.1     06-26    TPro  7.1  /  Alb  4.1  /  TBili  0.5  /  DBili  x   /  AST  21  /  ALT  31  /  AlkPhos  99  06-25    eGFR if Non African American: 75 mL/min/1.73M2 (06-26-20 @ 07:51)  eGFR if : 87 mL/min/1.73M2 (06-26-20 @ 07:51)  eGFR if Non African American: 61 mL/min/1.73M2 (06-25-20 @ 10:50)  eGFR if : 71 mL/min/1.73M2 (06-25-20 @ 10:50)    LIVER FUNCTIONS - ( 25 Jun 2020 10:50 )  Alb: 4.1 g/dL / Pro: 7.1 g/dL / ALK PHOS: 99 U/L / ALT: 31 U/L / AST: 21 U/L / GGT: x           ESR 64        Blood Gas Venous - Lactate: 1.0 mmoL/L (06-25-20 @ 10:57)      INFECTIOUS DISEASES TESTING      RADIOLOGY & ADDITIONAL TESTS:  I have personally reviewed the last available Chest xray      Xray Chest 1 View- PORTABLE-Urgent:   EXAM:  XR CHEST PORTABLE URGENT 1V            PROCEDURE DATE:  06/25/2020      INTERPRETATION:  Clinical History / Reason for exam: cough    Comparison : Chest radiograph 2/12/2020    Technique/Positioning: Frontal view of the chest. [facility]    Findings:    Support devices: None.    Cardiac/mediastinum/hilum: The cardiac silhouette is normal in size.    Lung parenchyma/Pleura: There is no focal consolidation, pleural effusion or pneumothorax.    Skeleton/soft tissues: Stable    Impression:      No evidence of focal consolidation, pleural effusion or pneumothorax.      JAILYN KOVACS M.D., ATTENDING RADIOLOGIST  This document has been electronically signed. Jun 25 2020  1:48PM     (06-25-20 @ 10:57)      [facility]: (06/25/20)    INTERPRETATION:  Clinical History / Reason for exam: This patient is a 54-year-old male with left lower extremity claudication pain.. Lower extremity arterial duplex ultrasound was performed to assess for arterial occlusive disease.    Bilateral common femoral, deep femoral, superficial femoral, popliteal, anterior tibial, posterior tibial and peroneal arteries were visualized and appeared patent.      Significant atherosclerotic occlusion or stenosis noted in the arteries of right lower extremity.    Mild-to-moderate atherosclerotic occlusive disease noted in the left popliteal and posterior tibial arteries with increased PSV of 1 39 cm/s in the left posterior tibial artery. There is spectral broadening and loss of triphasic waveforms noted in the above-mentioned arteries.    Enlarged lymph node noted in the left inguinal region measuring 3.2 x 2.8 x 0.9 cm.    Impression:    Mild atherosclerotic occlusive disease noted in the left tibial arteries.        CARDIOLOGY TESTING  12 Lead ECG:   Ventricular Rate 80 BPM    Atrial Rate 80 BPM    P-R Interval 160 ms    QRS Duration 90 ms    Q-T Interval 386 ms    QTC Calculation(Bezet) 445 ms    P Axis 49 degrees    R Axis -42 degrees    T Axis 63 degrees    Diagnosis Line Normal sinus rhythm  Left axis deviation  Abnormal ECG    Confirmed by Javy Pimentel ([id number]) on 6/25/2020 11:58:16 AM (06-25-20 @ 11:30)      All available historical records have been reviewed    MEDICATIONS  amLODIPine   Tablet 5  cefepime   IVPB 1000  enoxaparin Injectable 40  lisinopril 10  metroNIDAZOLE  IVPB 500  pregabalin 150  vancomycin  IVPB 1000      ANTIBIOTICS:  cefepime   IVPB 1000 milliGRAM(s) IV Intermittent every 8 hours  metroNIDAZOLE  IVPB 500 milliGRAM(s) IV Intermittent every 8 hours  vancomycin  IVPB 1000 milliGRAM(s) IV Intermittent every 12 hours      All available historical data has been reviewed

## 2020-06-26 NOTE — CONSULT NOTE ADULT - ATTENDING COMMENTS
Moderate-risk for intermediate-risk surgery Cardiologist:  Dr. Eubanks    RCC s/p R nephrectomy (2/2020) - NST prior to surgery was reportedly normal    EKG:  NSR, LAD, no acute ischemia    Moderate-risk for intermediate-risk surgery

## 2020-06-26 NOTE — PROGRESS NOTE ADULT - ASSESSMENT
pt is a 53 yo male w/ PMHx of HTN, PVD, right nephrectomy, sciatica presenting to the ED w/ worsening of chronic ulcers in b/l feet. Patient was seen earlier by podiatrist, Dr. Saldaña, who ordered MRI on 06/15 which showed Left 5th MTP bone osteomyelitis suggesting of septic arthritis and Right 5th MTP chronic osteomyelitis.    1.septic arthritis  of left  5 th toe  2.PVD  occlusive dis mild to moderate   3.right nephrectomy  4.hypertension    plan    podiatry inputs appreciated    possible foot surgery 06/29    IV antibiotic    infection dis on case    vascular surgery on case    cardiology consult for medical clearance     monitor labs    DVT PPX

## 2020-06-26 NOTE — PROGRESS NOTE ADULT - SUBJECTIVE AND OBJECTIVE BOX
SUBJECTIVE:    Patient is a 54y old Male who presents with a chief complaint of Pt admitted for b/l foot ulcers w/ septic arthritis in fifth MTP on the left foot and chronic osteomyelitis of right fifth MTP on right foot. (25 Jun 2020 15:08)    Currently admitted to medicine with the primary diagnosis of Osteomyelitis of foot, unspecified laterality, unspecified type   t is a 53 yo male w/ PMHx of HTN, right nephrectomy, sciatica presenting to the ED w/ chronic ulcers in b/l feet. Patient was seen earlier by podiatrist, Dr. Saldaña, who noticed the b/l foot ulcers getting worse, ordered MRI on 06/15 which showed Left 5th MTP bone osteomyelitis suggesting of septic arthritis and Right 5th MTP chronic osteomyelitis. Pt reports Left foot ulcer has started last year and has progressively getting worse over the last month with stabbing 08/10 pain, increase in pink drainage, and redness of the skin. Pt's chronic Right foot chronic ulcer was first noticed 3 years ago and pt has been previously hospitalized w/ abx for osteomyelitis. Pt denies any fevers, chills, CP, palpitations, SOB, n/v/d, burning on urination, hematuria, or weight changes.    Today is hospital day 1d. This morning he is resting comfortably in bed and reports no new issues or overnight events.     PAST MEDICAL & SURGICAL HISTORY  Back pain with sciatica  Numbness: legs  Umbilical hernia  Mild HTN  Sleep apnea: possible un diagnosed  PVD (peripheral vascular disease)  Peripheral neuropathy  Obesity  History of right nephrectomy: 02/2020    SOCIAL HISTORY:  Negative for smoking/alcohol/drug use.     ALLERGIES:  No Known Allergies    MEDICATIONS:  STANDING MEDICATIONS  amLODIPine   Tablet 5 milliGRAM(s) Oral daily  cefepime   IVPB 1000 milliGRAM(s) IV Intermittent every 8 hours  enoxaparin Injectable 40 milliGRAM(s) SubCutaneous daily  lisinopril 10 milliGRAM(s) Oral daily  metroNIDAZOLE  IVPB 500 milliGRAM(s) IV Intermittent every 8 hours  pregabalin 150 milliGRAM(s) Oral daily  vancomycin  IVPB 1000 milliGRAM(s) IV Intermittent every 12 hours    PRN MEDICATIONS    VITALS:   T(F): 97.9  HR: 84  BP: 109/60  RR: 18  SpO2: 98%    LABS:                        12.3   5.12  )-----------( 215      ( 26 Jun 2020 07:51 )             36.8     06-26    136  |  100  |  15  ----------------------------<  170<H>  4.2   |  24  |  1.1    Ca    8.8      26 Jun 2020 07:51  Mg     2.1     06-26    TPro  7.1  /  Alb  4.1  /  TBili  0.5  /  DBili  x   /  AST  21  /  ALT  31  /  AlkPhos  99  06-25    PT/INR - ( 25 Jun 2020 10:50 )   PT: 12.60 sec;   INR: 1.10 ratio         PTT - ( 25 Jun 2020 10:50 )  PTT:30.7 sec      Sedimentation Rate, Erythrocyte: 64 mm/Hr <H> (06-26-20 @ 07:51)          RADIOLOGY:    PHYSICAL EXAM:  GEN: No acute distress  LUNGS: Clear to auscultation bilaterally   HEART: Regular  ABD: Soft, non-tender, non-distended.  EXT: bilateral foot ulcer  left worse dressing   NEURO: AAOX3    Intravenous access:   NG tube:   [place]ent Catheter:

## 2020-06-26 NOTE — PROGRESS NOTE ADULT - ASSESSMENT
Pt is a 53 yo male w/ PMHx of HTN, PVD, right nephrectomy, sciatica presenting to the ED w/ worsening of chronic ulcers in b/l feet. Patient was seen earlier by podiatrist, Dr. Saldaña, who ordered MRI on 06/15 which showed Left 5th MTP bone osteomyelitis suggesting of septic arthritis and Right 5th MTP chronic osteomyelitis.    # Septic arthritis of Left 5th toe  - ID on board- holding abx for bone biopsy  - Podiatry following, possible surgery w/ Dr. Saldaña on 06/29  - HD stable  - AM Labs ordered: BMP, CBC, CRP, Mg, ESR, Type & screen    # PVD  - Duplex on 06/25 showed stenosis in the arteries of right lower extremity and mild-to-moderate atherosclerotic occlusive disease noted in the left popliteal and posterior tibial arteries.  - Vascular surgery consulted f/u after surgery     # s/p Right Nephrectomy  - Renal function stable  - BUN 15 (6/25) Cr 1.3 (6/25) eGFR 61 (6/25)    # HTN  - BP stable  - Continue home BP meds, amlodipine 5mg  - Takes benzapril 10 mg at home will give lisinopril 10mg.     # Sciatica   - Pregabalin 150mg    DVT ppx: Enoxaparin 40mg  Diet: DASH, Sodium and Cholesterol restricted  Activity: Weight bearing as tolerated  Code: Full code Pt is a 55 yo male w/ PMHx of HTN, PVD, right nephrectomy, sciatica presenting to the ED w/ worsening of chronic ulcers in b/l feet. Patient was seen earlier by podiatrist, Dr. Saldaña, who ordered MRI on 06/15 which showed Left 5th MTP bone osteomyelitis suggesting of septic arthritis and Right 5th MTP chronic osteomyelitis.    # Septic arthritis of Left 5th toe  - ID on board- holding abx for bone biopsy  - Podiatry following, possible surgery w/ Dr. Saldaña on 06/29  - HD stable  - AM Labs ordered: BMP, CBC, CRP, Mg, ESR, Type & screen  - Cardiology for pre-op clearance (Nuclear Stress and Echo from Dr. Eubanks [[phone]])    # PVD  - Duplex on 06/25 showed stenosis in the arteries of right lower extremity and mild-to-moderate atherosclerotic occlusive disease noted in the left popliteal and posterior tibial arteries.  - Vascular surgery consulted f/u after surgery     # s/p Right Nephrectomy  - Renal function stable  - BUN 15 (6/25) Cr 1.3 (6/25) eGFR 61 (6/25)    # HTN  - BP stable  - Continue home BP meds, amlodipine 5mg  - Takes benzapril 10 mg at home will give lisinopril 10mg.     # Sciatica   - Pregabalin 150mg    DVT ppx: Enoxaparin 40mg  Diet: DASH, Sodium and Cholesterol restricted  Activity: Weight bearing as tolerated  Code: Full code

## 2020-06-26 NOTE — CONSULT NOTE ADULT - REASON FOR ADMISSION
Pt admitted for b/l foot ulcers w/ septic arthritis in fifth MTP on the left foot and chronic osteomyelitis of right fifth MTP on right foot.

## 2020-06-26 NOTE — CHART NOTE - NSCHARTNOTEFT_GEN_A_CORE
Sx Scheduled for Monday 6/29 @ 10:30AM / 5th Metatarsal Resection; Left Foot; w/ Dr. Saldaña  Request Medical Clearance and OR Stratification  NPO @ Midnight Sunday 6/28; Pre-Op Labs; Optimization  Discussed Plan w/ Dr. Saldaña    Spectra; ; Sx Scheduled for Monday 6/29 @ 10:30AM / 5th Metatarsal Resection; Left Foot; w/ Dr. Saldaña  Pt. states that he has not received his Lyrica medication  Request Medical Clearance and OR Stratification  NPO @ Midnight Sunday 6/28; Pre-Op Labs; Optimization  Discussed Plan w/ Dr. Saldaña    Spectra;

## 2020-06-27 LAB
-  AMPICILLIN/SULBACTAM: SIGNIFICANT CHANGE UP
-  CEFAZOLIN: SIGNIFICANT CHANGE UP
-  CLINDAMYCIN: SIGNIFICANT CHANGE UP
-  ERYTHROMYCIN: SIGNIFICANT CHANGE UP
-  GENTAMICIN: SIGNIFICANT CHANGE UP
-  OXACILLIN: SIGNIFICANT CHANGE UP
-  PENICILLIN: SIGNIFICANT CHANGE UP
-  RIFAMPIN: SIGNIFICANT CHANGE UP
-  TETRACYCLINE: SIGNIFICANT CHANGE UP
-  TRIMETHOPRIM/SULFAMETHOXAZOLE: SIGNIFICANT CHANGE UP
-  VANCOMYCIN: SIGNIFICANT CHANGE UP
A1C WITH ESTIMATED AVERAGE GLUCOSE RESULT: 7.2 % — HIGH (ref 4–5.6)
ALBUMIN SERPL ELPH-MCNC: 4.2 G/DL — SIGNIFICANT CHANGE UP (ref 3.5–5.2)
ALP SERPL-CCNC: 92 U/L — SIGNIFICANT CHANGE UP (ref 30–115)
ALT FLD-CCNC: 36 U/L — SIGNIFICANT CHANGE UP (ref 0–41)
ANION GAP SERPL CALC-SCNC: 11 MMOL/L — SIGNIFICANT CHANGE UP (ref 7–14)
AST SERPL-CCNC: 26 U/L — SIGNIFICANT CHANGE UP (ref 0–41)
BILIRUB SERPL-MCNC: 0.4 MG/DL — SIGNIFICANT CHANGE UP (ref 0.2–1.2)
BUN SERPL-MCNC: 15 MG/DL — SIGNIFICANT CHANGE UP (ref 10–20)
CALCIUM SERPL-MCNC: 9.6 MG/DL — SIGNIFICANT CHANGE UP (ref 8.5–10.1)
CHLORIDE SERPL-SCNC: 100 MMOL/L — SIGNIFICANT CHANGE UP (ref 98–110)
CO2 SERPL-SCNC: 28 MMOL/L — SIGNIFICANT CHANGE UP (ref 17–32)
CREAT SERPL-MCNC: 1.2 MG/DL — SIGNIFICANT CHANGE UP (ref 0.7–1.5)
ERYTHROCYTE [SEDIMENTATION RATE] IN BLOOD: 56 MM/HR — HIGH (ref 0–10)
ESTIMATED AVERAGE GLUCOSE: 160 MG/DL — HIGH (ref 68–114)
GLUCOSE SERPL-MCNC: 152 MG/DL — HIGH (ref 70–99)
HCT VFR BLD CALC: 39.9 % — LOW (ref 42–52)
HGB BLD-MCNC: 13.1 G/DL — LOW (ref 14–18)
MAGNESIUM SERPL-MCNC: 2.2 MG/DL — SIGNIFICANT CHANGE UP (ref 1.8–2.4)
MCHC RBC-ENTMCNC: 27.9 PG — SIGNIFICANT CHANGE UP (ref 27–31)
MCHC RBC-ENTMCNC: 32.8 G/DL — SIGNIFICANT CHANGE UP (ref 32–37)
MCV RBC AUTO: 85.1 FL — SIGNIFICANT CHANGE UP (ref 80–94)
METHOD TYPE: SIGNIFICANT CHANGE UP
NRBC # BLD: 0 /100 WBCS — SIGNIFICANT CHANGE UP (ref 0–0)
PLATELET # BLD AUTO: 240 K/UL — SIGNIFICANT CHANGE UP (ref 130–400)
POTASSIUM SERPL-MCNC: 5 MMOL/L — SIGNIFICANT CHANGE UP (ref 3.5–5)
POTASSIUM SERPL-SCNC: 5 MMOL/L — SIGNIFICANT CHANGE UP (ref 3.5–5)
PROT SERPL-MCNC: 7.2 G/DL — SIGNIFICANT CHANGE UP (ref 6–8)
RBC # BLD: 4.69 M/UL — LOW (ref 4.7–6.1)
RBC # FLD: 13.5 % — SIGNIFICANT CHANGE UP (ref 11.5–14.5)
SODIUM SERPL-SCNC: 139 MMOL/L — SIGNIFICANT CHANGE UP (ref 135–146)
WBC # BLD: 5.46 K/UL — SIGNIFICANT CHANGE UP (ref 4.8–10.8)
WBC # FLD AUTO: 5.46 K/UL — SIGNIFICANT CHANGE UP (ref 4.8–10.8)

## 2020-06-27 PROCEDURE — 99233 SBSQ HOSP IP/OBS HIGH 50: CPT

## 2020-06-27 PROCEDURE — 99253 IP/OBS CNSLTJ NEW/EST LOW 45: CPT

## 2020-06-27 RX ADMIN — ENOXAPARIN SODIUM 40 MILLIGRAM(S): 100 INJECTION SUBCUTANEOUS at 11:35

## 2020-06-27 RX ADMIN — Medication 150 MILLIGRAM(S): at 11:34

## 2020-06-27 RX ADMIN — AMLODIPINE BESYLATE 5 MILLIGRAM(S): 2.5 TABLET ORAL at 05:36

## 2020-06-27 RX ADMIN — LISINOPRIL 10 MILLIGRAM(S): 2.5 TABLET ORAL at 05:36

## 2020-06-27 RX ADMIN — Medication 150 MILLIGRAM(S): at 23:38

## 2020-06-27 NOTE — PROGRESS NOTE ADULT - ASSESSMENT
ASSESSMENT  Patient is a 55 yo male w/ PMHx of Peripheral Neuropathy, HTN, right nephrectomy, sciatica presented to the ED w/ chronic ulcers in b/l feet.      IMPRESSION  # Osteomyelitis of Left 5th Metatarsal Bone with surrounding cellulitis and septic arthritis secondary to Chronic Nonhealing Ulcer on Plantar Surface of Left foot    - hold ABx till Sx    - F/u with Podiatry    # Chronic Osteomyelitis of Right 5th Metatarsal Bone secondary to Chronic Nonhealing Ulcer on Plantar surface of Right foot    - No recent changes    RECOMMENDATIONS;   Hold ABx till Sx on 6/29  post op start Vancomycin 1250 mg iv q12h, cefepime 2 gm iv q12h, flagyl 500 mg iv q8h

## 2020-06-27 NOTE — PROGRESS NOTE ADULT - SUBJECTIVE AND OBJECTIVE BOX
RAD PARKS  54y, Male    All available historical data reviewed    OVERNIGHT EVENTS:  none    ROS:  General: Denies rigors, night sweats  HEENT: Denies headache, rhinorrhea, sore throat, eye pain  CV: Denies CP, palpitations  PULM: Denies wheezing, hemoptysis  GI: Denies hematemesis, hematochezia, melena  : Denies discharge, hematuria  MSK: Denies arthralgias, myalgias  SKIN: Denies rash, lesions  NEURO: Denies paresthesias, weakness  PSYCH: Denies depression, anxiety    VITALS:  T(F): 96.7, Max: 97.4 (06-26-20 @ 19:55)  HR: 71  BP: 122/63  RR: 18Vital Signs Last 24 Hrs  T(C): 35.9 (27 Jun 2020 05:27), Max: 36.3 (26 Jun 2020 19:55)  T(F): 96.7 (27 Jun 2020 05:27), Max: 97.4 (26 Jun 2020 19:55)  HR: 71 (27 Jun 2020 05:27) (69 - 75)  BP: 122/63 (27 Jun 2020 05:27) (122/63 - 133/63)  BP(mean): --  RR: 18 (27 Jun 2020 05:27) (17 - 18)  SpO2: 98% (27 Jun 2020 07:30) (96% - 98%)    TESTS & MEASUREMENTS:                        13.1   5.46  )-----------( 240      ( 27 Jun 2020 06:37 )             39.9     06-27    139  |  100  |  15  ----------------------------<  152<H>  5.0   |  28  |  1.2    Ca    9.6      27 Jun 2020 06:37  Mg     2.2     06-27    TPro  7.2  /  Alb  4.2  /  TBili  0.4  /  DBili  x   /  AST  26  /  ALT  36  /  AlkPhos  92  06-27    LIVER FUNCTIONS - ( 27 Jun 2020 06:37 )  Alb: 4.2 g/dL / Pro: 7.2 g/dL / ALK PHOS: 92 U/L / ALT: 36 U/L / AST: 26 U/L / GGT: x             Culture - Abscess with Gram Stain (collected 06-25-20 @ 13:19)  Source: .Abscess Left 5th metatarsal  Preliminary Report (06-26-20 @ 16:23):    Few Staphylococcus aureus    Culture - Blood (collected 06-25-20 @ 10:50)  Source: .Blood Blood  Preliminary Report (06-26-20 @ 17:01):    No growth to date.    Culture - Blood (collected 06-25-20 @ 10:50)  Source: .Blood Blood  Preliminary Report (06-26-20 @ 17:01):    No growth to date.            RADIOLOGY & ADDITIONAL TESTS:  Personal review of radiological diagnostics performed  Echo and EKG results noted when applicable.     MEDICATIONS:  amLODIPine   Tablet 5 milliGRAM(s) Oral daily  enoxaparin Injectable 40 milliGRAM(s) SubCutaneous daily  lisinopril 10 milliGRAM(s) Oral daily  pregabalin 150 milliGRAM(s) Oral daily      ANTIBIOTICS:

## 2020-06-27 NOTE — PROGRESS NOTE ADULT - SUBJECTIVE AND OBJECTIVE BOX
C[employer] FOR [employer]    Patient is a 54y old  Male who presents with a chief complaint of Pt admitted for b/l foot ulcers w/ septic arthritis in fifth MTP on the left foot and chronic osteomyelitis of right fifth MTP on right foot. (27 Jun 2020 10:01)    HPI:  Pt is a 55 yo male w/ PMHx of HTN, right nephrectomy, sciatica presenting to the ED w/ chronic ulcers in b/l feet. Patient was seen earlier by podiatrist, Dr. Saldaña, who noticed the b/l foot ulcers getting worse, ordered MRI on 06/15 which showed Left 5th MTP bone osteomyelitis suggesting of septic arthritis and Right 5th MTP chronic osteomyelitis. Pt reports Left foot ulcer has started last year and has progressively getting worse over the last month with stabbing 08/10 pain, increase in pink drainage, and redness of the skin. Pt's chronic Right foot chronic ulcer was first noticed 3 years ago and pt has been previously hospitalized w/ abx for osteomyelitis. Pt denies any fevers, chills, CP, palpitations, SOB, n/v/d, burning on urination, hematuria, or weight changes. (25 Jun 2020 15:08)    INTERVAL HPI/OVERNIGHT EVENTS:    ROS: All ROS negative except    PHYSICAL EXAM:  T(C): 35.9, Max: 36.3 (06-26-20 @ 19:55)  HR: 74 (71 - 75)  BP: 109/54 (109/54 - 127/76)  RR: 18 (18 - 18)  SpO2: 98% (96% - 98%)    GENERAL: NAD  NECK: Supple, No JVD  PULMONARY/CHEST: No rales, rhonchi, wheezing  CARDIOVASC: Regular rate and rhythm; No murmurs  GI/ABDOMEN: Soft, Nontender, Nondistended; Bowel sounds present  EXTREMITIES:  B/l foot ulcers, clean dressing. feet warm, good pulses. No calf tenderness b/l.  NERVOUS SYSTEM:  Alert & Oriented X3, no focal deficit     Consultant(s) Notes Reviewed by me.     LABS:                        13.1   5.46  )-----------( 240      ( 27 Jun 2020 06:37 )             39.9     06-27    139  |  100  |  15  ----------------------------<  152<H>  5.0   |  28  |  1.2    Ca    9.6      27 Jun 2020 06:37  Mg     2.2     06-27    TPro  7.2  /  Alb  4.2  /  TBili  0.4  /  DBili  x   /  AST  26  /  ALT  36  /  AlkPhos  92  06-27    Sedimentation Rate, Erythrocyte: 64 mm/Hr (06.26.20 @ 07:51)    C-Reactive Protein, Serum: 7.29 mg/dL (06.26.20 @ 07:51)    Culture - Abscess with Gram Stain (collected 25 Jun 2020 13:19)  Source: .Abscess Left 5th metatarsal  Preliminary Report (26 Jun 2020 16:23):    Few Staphylococcus aureus    Culture - Blood (collected 25 Jun 2020 10:50)  Source: .Blood Blood  Preliminary Report (26 Jun 2020 17:01):    No growth to date.    Culture - Blood (collected 25 Jun 2020 10:50)  Source: .Blood Blood  Preliminary Report (26 Jun 2020 17:01):    No growth to date.      RADIOLOGY & ADDITIONAL TESTS:  < from: [facility] Duplex Lower Extrem Arterial, Bilat (06.25.20 @ 11:18) >  Impression:    Mild atherosclerotic occlusive disease noted in the left tibial arteries.    < end of copied text >        MEDICATIONS  (STANDING):  amLODIPine   Tablet 5 milliGRAM(s) Oral daily  enoxaparin Injectable 40 milliGRAM(s) SubCutaneous daily  lisinopril 10 milliGRAM(s) Oral daily  pregabalin 150 milliGRAM(s) Oral daily    MEDICATIONS  (PRN):

## 2020-06-27 NOTE — PROGRESS NOTE ADULT - ASSESSMENT
Pt is a 53 yo male w/ PMHx of HTN, PVD, right nephrectomy, sciatica presenting to the ED w/ worsening of chronic ulcers in b/l feet. Patient was seen earlier by podiatrist, Dr. Saldaña, who ordered MRI on 06/15 which showed Left 5th MTP bone osteomyelitis suggesting of septic arthritis and Right 5th MTP chronic osteomyelitis.    # Septic arthritis of Left 5th toe. Chronic OM. no sepsis.  - ESR, CRP elevated  - US duplex mild arterial disease, [facility]   - BCx NGTD  - ABx on hold until Sx, then can start Vancomycin 1250 mg iv q12h, cefepime 2 gm iv q12h, flagyl 500 mg iv q8h as per ID  - wound care by nurse  - OR on Monday by podiatry, cx to be send from OR  - preop labs tomorrow    # PAD  - Duplex on 06/25 showed stenosis in the arteries of right lower extremity and mild-to-moderate atherosclerotic occlusive disease noted in the left popliteal and posterior tibial arteries.  - Op f/u with vascular     # s/p Right Nephrectomy due to mass (malignant)  - Renal function stable  - OP f/u with primary urology for surveillance     # HTN - cont current meds    # Sciatica - Pregabalin 150mg    DVT ppx: Enoxaparin 40mg, hold monday AM      #Progress Note Handoff  Pending or ON MONDAY

## 2020-06-27 NOTE — PROGRESS NOTE ADULT - SUBJECTIVE AND OBJECTIVE BOX
PROGRESS NOTE   Pt seen @ bedside during AM rounds. Dressing +Clean, +Dry, +Intact. Pt. denies any recent n/f/v/c/sob. Pt. denies any other pedal complaints at this time.      Vital Signs Last 24 Hrs  T(C): 35.9 (27 Jun 2020 05:27), Max: 36.3 (26 Jun 2020 19:55)  T(F): 96.7 (27 Jun 2020 05:27), Max: 97.4 (26 Jun 2020 19:55)  HR: 71 (27 Jun 2020 05:27) (69 - 75)  BP: 122/63 (27 Jun 2020 05:27) (122/63 - 133/63)  BP(mean): --  RR: 18 (27 Jun 2020 05:27) (17 - 18)  SpO2: 96% (26 Jun 2020 23:47) (95% - 96%)                          13.1   5.46  )-----------( 240      ( 27 Jun 2020 06:37 )             39.9               06-27    139  |  100  |  15  ----------------------------<  152<H>  5.0   |  28  |  1.2    Ca    9.6      27 Jun 2020 06:37  Mg     2.2     06-27    TPro  7.2  /  Alb  4.2  /  TBili  0.4  /  DBili  x   /  AST  26  /  ALT  36  /  AlkPhos  92  06-27      PHYSICAL EXAM  B/L LE Focused:  Derm:   Open Wound to the lateral aspect of 5th metatarsal head, plantar   Probes to deep capsule; With undermining at the 6 o'clock position   Mild serous drainage noted, No malodor noted  Fibrogranular wound base, Mildly macerated wound edges  Rash noted on the dorsum of the right foot proximal to the 5th digit    Open wound to plantar aspect of 5th metatarsal head   Hyperkeratotic periwound mild serous sanguinous drainage  No malodor    Vascular: DP and PT Pulses Diminished; Foot is Warm to Warm to the touch   Neuro: Protective Sensation Diminished  MSK: No Pain On Palpation at Wound Site     Assessment:  OM of 5th Metatarsal Head, L. foot  Open wound 5th submet, right foot          Plan:  Pt. seen and evaluated  Discussed treatment and condition w/ patient  Flushed with normal saline  Applied betadine/DSD/Kerlix  Wound care orders: As stated above  Sx scheduled for Mon, 6/29 for Exc Dbx of st/b w/ 5th met chano, L. foot  Please optimize all pre-surgical labs prior to sx on Monday, 6/29  NPO MN on Sun, 6/28  Attending aware

## 2020-06-28 LAB
-  AMPICILLIN: SIGNIFICANT CHANGE UP
-  TETRACYCLINE: SIGNIFICANT CHANGE UP
-  VANCOMYCIN: SIGNIFICANT CHANGE UP
ALBUMIN SERPL ELPH-MCNC: 4.2 G/DL — SIGNIFICANT CHANGE UP (ref 3.5–5.2)
ALP SERPL-CCNC: 92 U/L — SIGNIFICANT CHANGE UP (ref 30–115)
ALT FLD-CCNC: 54 U/L — HIGH (ref 0–41)
ANION GAP SERPL CALC-SCNC: 14 MMOL/L — SIGNIFICANT CHANGE UP (ref 7–14)
APTT BLD: 36 SEC — SIGNIFICANT CHANGE UP (ref 27–39.2)
AST SERPL-CCNC: 45 U/L — HIGH (ref 0–41)
BILIRUB SERPL-MCNC: 0.6 MG/DL — SIGNIFICANT CHANGE UP (ref 0.2–1.2)
BUN SERPL-MCNC: 17 MG/DL — SIGNIFICANT CHANGE UP (ref 10–20)
CALCIUM SERPL-MCNC: 9.2 MG/DL — SIGNIFICANT CHANGE UP (ref 8.5–10.1)
CHLORIDE SERPL-SCNC: 99 MMOL/L — SIGNIFICANT CHANGE UP (ref 98–110)
CO2 SERPL-SCNC: 27 MMOL/L — SIGNIFICANT CHANGE UP (ref 17–32)
CREAT SERPL-MCNC: 1.3 MG/DL — SIGNIFICANT CHANGE UP (ref 0.7–1.5)
GLUCOSE SERPL-MCNC: 154 MG/DL — HIGH (ref 70–99)
HCT VFR BLD CALC: 39.4 % — LOW (ref 42–52)
HGB BLD-MCNC: 13.5 G/DL — LOW (ref 14–18)
INR BLD: 1.07 RATIO — SIGNIFICANT CHANGE UP (ref 0.65–1.3)
MAGNESIUM SERPL-MCNC: 2 MG/DL — SIGNIFICANT CHANGE UP (ref 1.8–2.4)
MCHC RBC-ENTMCNC: 29.5 PG — SIGNIFICANT CHANGE UP (ref 27–31)
MCHC RBC-ENTMCNC: 34.3 G/DL — SIGNIFICANT CHANGE UP (ref 32–37)
MCV RBC AUTO: 86.2 FL — SIGNIFICANT CHANGE UP (ref 80–94)
METHOD TYPE: SIGNIFICANT CHANGE UP
NRBC # BLD: 0 /100 WBCS — SIGNIFICANT CHANGE UP (ref 0–0)
PLATELET # BLD AUTO: 252 K/UL — SIGNIFICANT CHANGE UP (ref 130–400)
POTASSIUM SERPL-MCNC: 4.4 MMOL/L — SIGNIFICANT CHANGE UP (ref 3.5–5)
POTASSIUM SERPL-SCNC: 4.4 MMOL/L — SIGNIFICANT CHANGE UP (ref 3.5–5)
PROT SERPL-MCNC: 7 G/DL — SIGNIFICANT CHANGE UP (ref 6–8)
PROTHROM AB SERPL-ACNC: 12.3 SEC — SIGNIFICANT CHANGE UP (ref 9.95–12.87)
RBC # BLD: 4.57 M/UL — LOW (ref 4.7–6.1)
RBC # FLD: 13.5 % — SIGNIFICANT CHANGE UP (ref 11.5–14.5)
SODIUM SERPL-SCNC: 140 MMOL/L — SIGNIFICANT CHANGE UP (ref 135–146)
WBC # BLD: 5.88 K/UL — SIGNIFICANT CHANGE UP (ref 4.8–10.8)
WBC # FLD AUTO: 5.88 K/UL — SIGNIFICANT CHANGE UP (ref 4.8–10.8)

## 2020-06-28 RX ORDER — HYDROCORTISONE 1 %
6 OINTMENT (GRAM) TOPICAL DAILY
Refills: 0 | Status: DISCONTINUED | OUTPATIENT
Start: 2020-06-28 | End: 2020-06-29

## 2020-06-28 RX ORDER — HYDROCORTISONE 1 %
1 OINTMENT (GRAM) TOPICAL DAILY
Refills: 0 | Status: DISCONTINUED | OUTPATIENT
Start: 2020-06-28 | End: 2020-06-28

## 2020-06-28 RX ORDER — CHLORHEXIDINE GLUCONATE 213 G/1000ML
1 SOLUTION TOPICAL
Refills: 0 | Status: DISCONTINUED | OUTPATIENT
Start: 2020-06-28 | End: 2020-06-29

## 2020-06-28 RX ADMIN — ENOXAPARIN SODIUM 40 MILLIGRAM(S): 100 INJECTION SUBCUTANEOUS at 13:10

## 2020-06-28 RX ADMIN — AMLODIPINE BESYLATE 5 MILLIGRAM(S): 2.5 TABLET ORAL at 05:34

## 2020-06-28 RX ADMIN — Medication 6 APPLICATION(S): at 20:47

## 2020-06-28 RX ADMIN — Medication 150 MILLIGRAM(S): at 05:35

## 2020-06-28 RX ADMIN — Medication 150 MILLIGRAM(S): at 13:10

## 2020-06-28 RX ADMIN — LISINOPRIL 10 MILLIGRAM(S): 2.5 TABLET ORAL at 05:34

## 2020-06-28 RX ADMIN — Medication 150 MILLIGRAM(S): at 23:35

## 2020-06-28 RX ADMIN — Medication 150 MILLIGRAM(S): at 17:32

## 2020-06-28 NOTE — PROGRESS NOTE ADULT - SUBJECTIVE AND OBJECTIVE BOX
SUBJECTIVE:    Patient is a 54y old Male who presents with a chief complaint of Pt admitted for b/l foot ulcers w/ septic arthritis in fifth MTP on the left foot and chronic osteomyelitis of right fifth MTP on right foot. (27 Jun 2020 13:22)    Currently admitted to medicine with the primary diagnosis of Osteomyelitis of foot, unspecified laterality, unspecified type     Today is hospital day 3d.  infectious dis inputs appreciated     PAST MEDICAL & SURGICAL HISTORY  Back pain with sciatica  Numbness: legs  Umbilical hernia  Mild HTN  Sleep apnea: possible un diagnosed  PVD (peripheral vascular disease)  Peripheral neuropathy  Obesity  History of right nephrectomy: 02/2020    SOCIAL HISTORY:  Negative for smoking/alcohol/drug use.     ALLERGIES:  No Known Allergies    MEDICATIONS:  STANDING MEDICATIONS  amLODIPine   Tablet 5 milliGRAM(s) Oral daily  chlorhexidine 4% Liquid 1 Application(s) Topical <User Schedule>  enoxaparin Injectable 40 milliGRAM(s) SubCutaneous daily  lisinopril 10 milliGRAM(s) Oral daily  pregabalin 150 milliGRAM(s) Oral four times a day    PRN MEDICATIONS    VITALS:   T(F): 97  HR: 77  BP: 131/92  RR: 18  SpO2: 98%    LABS:                        13.1   5.46  )-----------( 240      ( 27 Jun 2020 06:37 )             39.9     06-27    139  |  100  |  15  ----------------------------<  152<H>  5.0   |  28  |  1.2    Ca    9.6      27 Jun 2020 06:37  Mg     2.2     06-27    TPro  7.2  /  Alb  4.2  /  TBili  0.4  /  DBili  x   /  AST  26  /  ALT  36  /  AlkPhos  92  06-27              Culture - Abscess with Gram Stain (collected 25 Jun 2020 13:19)  Source: .Abscess Left 5th metatarsal  Preliminary Report (27 Jun 2020 20:59):    Moderate Staphylococcus aureus    Few Enterococcus faecalis  Organism: Staphylococcus aureus (27 Jun 2020 19:11)  Organism: Staphylococcus aureus (27 Jun 2020 19:11)    Culture - Blood (collected 25 Jun 2020 10:50)  Source: .Blood Blood  Preliminary Report (26 Jun 2020 17:01):    No growth to date.    Culture - Blood (collected 25 Jun 2020 10:50)  Source: .Blood Blood  Preliminary Report (26 Jun 2020 17:01):    No growth to date.          RADIOLOGY:    PHYSICAL EXAM:  GEN: No acute distress  LUNGS: Clear to auscultation bilaterally   HEART: Regular  ABD: Soft, non-tender, non-distended.  EXT: left 5 th metatarsal ulcer   right foot plantar ulcer   NEURO: AAOX3    Intravenous access:   NG tube:   Kent Catheter:

## 2020-06-28 NOTE — PROGRESS NOTE ADULT - SUBJECTIVE AND OBJECTIVE BOX
DAILY PROGRESS NOTE  ===========================================================    Patient Information:  RAD PARKS  /  54y  /  Male  /  MRN#: [id number]    Hospital Day: 3d     |:::::::::::::::::::::::::::| SUBJECTIVE |:::::::::::::::::::::::::::|    OVERNIGHT EVENTS: None  TODAY: Patient was seen today at bedside. Review of systems is otherwise negative. No complaints, going for surgery tomorrow.    |:::::::::::::::::::::::::::| OBJECTIVE |:::::::::::::::::::::::::::|    VITAL SIGNS: Last 24 Hours  T(C): 35.3 (28 Jun 2020 12:51), Max: 36.1 (28 Jun 2020 05:18)  T(F): 95.5 (28 Jun 2020 12:51), Max: 97 (28 Jun 2020 05:18)  HR: 79 (28 Jun 2020 12:51) (73 - 79)  BP: 106/57 (28 Jun 2020 12:51) (106/57 - 133/72)  BP(mean): --  RR: 17 (28 Jun 2020 12:51) (17 - 18)  SpO2: 98% (28 Jun 2020 07:30) (98% - 98%)    PHYSICAL EXAM:  GENERAL:   Awake, alert; NAD.  HEENT:  Head NC/AT; Conjunctivae pink, Sclera anicteric; Oral mucosa moist.  CARDIO:   Regular rate; Regular rhythm; S1 & S2.  RESP:   No rales, wheezing, or rhonchi appreciated.  GI:   Soft; NT/ND; BS; No guarding; No rebound tenderness.  EXT:   Strength UE 5/5; Strength LE 5/5; No edema.  Wound dressings on feet.  SKIN:   Intact.    LAB RESULTS:                        13.5   5.88  )-----------( 252      ( 28 Jun 2020 06:28 )             39.4     06-28    140  |  99  |  17  ----------------------------<  154<H>  4.4   |  27  |  1.3    Ca    9.2      28 Jun 2020 06:28  Mg     2.0     06-28    TPro  7.0  /  Alb  4.2  /  TBili  0.6  /  DBili  x   /  AST  45<H>  /  ALT  54<H>  /  AlkPhos  92  06-28    MICROBIOLOGY:    Culture - Abscess with Gram Stain (collected 25 Jun 2020 13:19)  Source: .Abscess Left 5th metatarsal  Preliminary Report (27 Jun 2020 20:59):    Moderate Staphylococcus aureus    Few Enterococcus faecalis  Organism: Staphylococcus aureus (27 Jun 2020 19:11)  Organism: Staphylococcus aureus (27 Jun 2020 19:11)      RADIOLOGY:    ALLERGIES: No Known Allergies      ===========================================================

## 2020-06-28 NOTE — PROGRESS NOTE ADULT - ASSESSMENT
Patient is a 53 yo male w/ PMHx of Peripheral Neuropathy, HTN, right nephrectomy, sciatica presented to the ED w/ chronic ulcers in b/l feet.    OM of left 5 th metatarsal bone  septic arthritics  cellulitis left foot  chronic om of right  foot plantar surface    plan ---  -going to OR Monday  -hold antibiotic as per ID until Surgery  -NPO mid night today  -Medically cleared for surgery   -post op start vancomycin and cefepime  -continue BP meds  -will follow w Podiatry       disposition active    [id number]  [id number]

## 2020-06-28 NOTE — PROGRESS NOTE ADULT - ASSESSMENT
# Septic arthritis of Left 5th toe  - ID on board- holding abx for bone biopsy  - Podiatry following, surgery w/ Dr. Saldaña on 06/29 @ 12:30   - HD stable  - Cleared for surgery, NPO @ midnight    # PVD  - Duplex on 06/25 showed stenosis in the arteries of right lower extremity and mild-to-moderate atherosclerotic occlusive disease noted in the left popliteal and posterior tibial arteries.  - Vascular surgery consulted f/u after surgery     # s/p Right Nephrectomy  - Renal function stable  - BUN 15 (6/25) Cr 1.3 (6/25) eGFR 61 (6/25)    # HTN  - BP stable  - Continue home BP meds, amlodipine 5mg  - Takes benzapril 10 mg at home will give lisinopril 10mg.     # Sciatica   - Pregabalin 150mg    DVT ppx: Enoxaparin 40mg  Diet: DASH, Sodium and Cholesterol restricted  Activity: Weight bearing as tolerated  Code: Full code

## 2020-06-28 NOTE — PROGRESS NOTE ADULT - ASSESSMENT
ASSESSMENT  Patient is a 55 yo male w/ PMHx of Peripheral Neuropathy, HTN, right nephrectomy, sciatica presented to the ED w/ chronic ulcers in b/l feet.      IMPRESSION  # Osteomyelitis of Left 5th Metatarsal Bone with surrounding cellulitis and septic arthritis secondary to Chronic Nonhealing Ulcer on Plantar Surface of Left foot    - hold ABx till Sx    - F/u with Podiatry  # Chronic Osteomyelitis of Right 5th Metatarsal Bone secondary to Chronic Nonhealing Ulcer on Plantar surface of Right foot    - No recent changes    RECOMMENDATIONS;   Hold ABx till Sx on 6/29  POST OP UNASYN 3 GM IV Q6H

## 2020-06-28 NOTE — PROGRESS NOTE ADULT - SUBJECTIVE AND OBJECTIVE BOX
RAD PARKS  54y, Male    All available historical data reviewed    OVERNIGHT EVENTS:    none  ROS:  General: Denies rigors, nightsweats  HEENT: Denies headache, rhinorrhea, sore throat, eye pain  CV: Denies CP, palpitations  PULM: Denies wheezing, hemoptysis  GI: Denies hematemesis, hematochezia, melena  : Denies discharge, hematuria  MSK: Denies arthralgias, myalgias  SKIN: Denies rash, lesions  NEURO: Denies paresthesias, weakness  PSYCH: Denies depression, anxiety    VITALS:  T(F): 97, Max: 97 (06-28-20 @ 05:18)  HR: 77  BP: 131/92  RR: 18Vital Signs Last 24 Hrs  T(C): 36.1 (28 Jun 2020 05:18), Max: 36.1 (28 Jun 2020 05:18)  T(F): 97 (28 Jun 2020 05:18), Max: 97 (28 Jun 2020 05:18)  HR: 77 (28 Jun 2020 05:18) (73 - 77)  BP: 131/92 (28 Jun 2020 05:18) (109/54 - 133/72)  BP(mean): --  RR: 18 (28 Jun 2020 05:18) (18 - 18)  SpO2: 98% (28 Jun 2020 07:30) (98% - 98%)    TESTS & MEASUREMENTS:                        13.5   5.88  )-----------( 252      ( 28 Jun 2020 06:28 )             39.4     06-28    140  |  99  |  17  ----------------------------<  154<H>  4.4   |  27  |  1.3    Ca    9.2      28 Jun 2020 06:28  Mg     2.0     06-28    TPro  7.0  /  Alb  4.2  /  TBili  0.6  /  DBili  x   /  AST  45<H>  /  ALT  54<H>  /  AlkPhos  92  06-28    LIVER FUNCTIONS - ( 28 Jun 2020 06:28 )  Alb: 4.2 g/dL / Pro: 7.0 g/dL / ALK PHOS: 92 U/L / ALT: 54 U/L / AST: 45 U/L / GGT: x             Culture - Abscess with Gram Stain (collected 06-25-20 @ 13:19)  Source: .Abscess Left 5th metatarsal  Preliminary Report (06-27-20 @ 20:59):    Moderate Staphylococcus aureus    Few Enterococcus faecalis  Organism: Staphylococcus aureus (06-27-20 @ 19:11)  Organism: Staphylococcus aureus (06-27-20 @ 19:11)      -  Ampicillin/Sulbactam: S <=8/4      -  Cefazolin: S <=4      -  Clindamycin: S <=0.25      -  Erythromycin: S <=0.25      -  Gentamicin: S <=1 Should not be used as monotherapy      -  Oxacillin: S 0.5      -  Penicillin: R 4      -  RIF- Rifampin: S <=1 Should not be used as monotherapy      -  Tetra/Doxy: S <=1      -  Trimethoprim/Sulfamethoxazole: S <=0.5/9.5      -  Vancomycin: S 1      Method Type: CINDY    Culture - Blood (collected 06-25-20 @ 10:50)  Source: .Blood Blood  Preliminary Report (06-26-20 @ 17:01):    No growth to date.    Culture - Blood (collected 06-25-20 @ 10:50)  Source: .Blood Blood  Preliminary Report (06-26-20 @ 17:01):    No growth to date.            RADIOLOGY & ADDITIONAL TESTS:  Personal review of radiological diagnostics performed  Echo and EKG results noted when applicable.     MEDICATIONS:  amLODIPine   Tablet 5 milliGRAM(s) Oral daily  chlorhexidine 4% Liquid 1 Application(s) Topical <User Schedule>  enoxaparin Injectable 40 milliGRAM(s) SubCutaneous daily  lisinopril 10 milliGRAM(s) Oral daily  pregabalin 150 milliGRAM(s) Oral four times a day      ANTIBIOTICS:

## 2020-06-28 NOTE — PROGRESS NOTE ADULT - SUBJECTIVE AND OBJECTIVE BOX
Patient is a 54y old  Male who presents with a chief complaint of Pt admitted for b/l foot ulcers w/ septic arthritis in fifth MTP on the left foot and chronic osteomyelitis of right fifth MTP on right foot. (28 Jun 2020 09:02)      INTERVAL HPI/OVERNIGHT EVENTS:   Pt is scheduled for tomorrow, 6/29 with Dr. Saldaña at 12:30pm. Patient is aware of procedure and is NPO since midnight.    MEDICATIONS  (STANDING):  amLODIPine   Tablet 5 milliGRAM(s) Oral daily  chlorhexidine 4% Liquid 1 Application(s) Topical <User Schedule>  enoxaparin Injectable 40 milliGRAM(s) SubCutaneous daily  lisinopril 10 milliGRAM(s) Oral daily  pregabalin 150 milliGRAM(s) Oral four times a day    MEDICATIONS  (PRN):    OSTEOMYELITIS OF FOOT  ^INFECTION IN FOOT  Family history of multiple myeloma  FH: prostate cancer  FH: pulmonary embolism  FH: heart attack  Handoff  MEWS Score  Umbilical hernia  Lumbar disc herniation  Prostate cancer  Back pain with sciatica  Back pain  Numbness  Umbilical hernia  Mild HTN  Sleep apnea  PVD (peripheral vascular disease)  Peripheral neuropathy  Obesity  Osteomyelitis of foot, unspecified laterality, unspecified type  History of right nephrectomy  No significant past surgical history  INFECTION IN FOOT  90+    Allergies    No Known Allergies    Intolerances        Vital Signs Last 24 Hrs  T(C): 36.1 (28 Jun 2020 05:18), Max: 36.1 (28 Jun 2020 05:18)  T(F): 97 (28 Jun 2020 05:18), Max: 97 (28 Jun 2020 05:18)  HR: 77 (28 Jun 2020 05:18) (73 - 77)  BP: 131/92 (28 Jun 2020 05:18) (109/54 - 133/72)  BP(mean): --  RR: 18 (28 Jun 2020 05:18) (18 - 18)  SpO2: 98% (28 Jun 2020 07:30) (98% - 98%)    LABS:                        13.5   5.88  )-----------( 252      ( 28 Jun 2020 06:28 )             39.4     06-28    140  |  99  |  17  ----------------------------<  154<H>  4.4   |  27  |  1.3    Ca    9.2      28 Jun 2020 06:28  Mg     2.0     06-28    TPro  7.0  /  Alb  4.2  /  TBili  0.6  /  DBili  x   /  AST  45<H>  /  ALT  54<H>  /  AlkPhos  92  06-28      CAPILLARY BLOOD GLUCOSE          Type and screen: Done    RADIOLOGY & ADDITIONAL TESTS:    Plan:   To OR tomorrow, 6/29 at 12;30pm  with Dr. Saldaña for excisional debridement of soft tissue and bone w/ 5th met resection, L. foot  CXR on sunrise.  EKG on sunrise.  NPO @ MN  Cardiac clearance documented in chart.  Procedure was explained to patient in detail. All alternatives, risks and complications were discussed. All questions answered.

## 2020-06-29 ENCOUNTER — RESULT REVIEW (OUTPATIENT)
Age: 55
End: 2020-06-29

## 2020-06-29 PROCEDURE — 88311 DECALCIFY TISSUE: CPT | Mod: 26

## 2020-06-29 PROCEDURE — 73630 X-RAY EXAM OF FOOT: CPT | Mod: 26,LT

## 2020-06-29 PROCEDURE — 88304 TISSUE EXAM BY PATHOLOGIST: CPT | Mod: 26

## 2020-06-29 RX ORDER — ENOXAPARIN SODIUM 100 MG/ML
40 INJECTION SUBCUTANEOUS DAILY
Refills: 0 | Status: DISCONTINUED | OUTPATIENT
Start: 2020-06-29 | End: 2020-07-03

## 2020-06-29 RX ORDER — SODIUM CHLORIDE 9 MG/ML
1000 INJECTION, SOLUTION INTRAVENOUS
Refills: 0 | Status: DISCONTINUED | OUTPATIENT
Start: 2020-06-29 | End: 2020-06-29

## 2020-06-29 RX ORDER — MORPHINE SULFATE 50 MG/1
2 CAPSULE, EXTENDED RELEASE ORAL
Refills: 0 | Status: DISCONTINUED | OUTPATIENT
Start: 2020-06-29 | End: 2020-06-29

## 2020-06-29 RX ORDER — LISINOPRIL 2.5 MG/1
10 TABLET ORAL DAILY
Refills: 0 | Status: DISCONTINUED | OUTPATIENT
Start: 2020-06-29 | End: 2020-07-03

## 2020-06-29 RX ORDER — AMLODIPINE BESYLATE 2.5 MG/1
5 TABLET ORAL DAILY
Refills: 0 | Status: DISCONTINUED | OUTPATIENT
Start: 2020-06-29 | End: 2020-07-03

## 2020-06-29 RX ORDER — AMPICILLIN SODIUM AND SULBACTAM SODIUM 250; 125 MG/ML; MG/ML
3 INJECTION, POWDER, FOR SUSPENSION INTRAMUSCULAR; INTRAVENOUS EVERY 6 HOURS
Refills: 0 | Status: DISCONTINUED | OUTPATIENT
Start: 2020-06-29 | End: 2020-07-02

## 2020-06-29 RX ORDER — CHLORHEXIDINE GLUCONATE 213 G/1000ML
1 SOLUTION TOPICAL
Refills: 0 | Status: DISCONTINUED | OUTPATIENT
Start: 2020-06-29 | End: 2020-07-03

## 2020-06-29 RX ORDER — HYDROMORPHONE HYDROCHLORIDE 2 MG/ML
0.5 INJECTION INTRAMUSCULAR; INTRAVENOUS; SUBCUTANEOUS
Refills: 0 | Status: DISCONTINUED | OUTPATIENT
Start: 2020-06-29 | End: 2020-06-29

## 2020-06-29 RX ORDER — ONDANSETRON 8 MG/1
4 TABLET, FILM COATED ORAL ONCE
Refills: 0 | Status: DISCONTINUED | OUTPATIENT
Start: 2020-06-29 | End: 2020-06-29

## 2020-06-29 RX ORDER — HYDROCORTISONE 1 %
1 OINTMENT (GRAM) TOPICAL
Refills: 0 | Status: DISCONTINUED | OUTPATIENT
Start: 2020-06-29 | End: 2020-07-03

## 2020-06-29 RX ADMIN — Medication 150 MILLIGRAM(S): at 23:09

## 2020-06-29 RX ADMIN — Medication 1 APPLICATION(S): at 17:16

## 2020-06-29 RX ADMIN — LISINOPRIL 10 MILLIGRAM(S): 2.5 TABLET ORAL at 05:24

## 2020-06-29 RX ADMIN — AMLODIPINE BESYLATE 5 MILLIGRAM(S): 2.5 TABLET ORAL at 05:24

## 2020-06-29 RX ADMIN — Medication 150 MILLIGRAM(S): at 06:26

## 2020-06-29 RX ADMIN — CHLORHEXIDINE GLUCONATE 1 APPLICATION(S): 213 SOLUTION TOPICAL at 05:24

## 2020-06-29 RX ADMIN — AMPICILLIN SODIUM AND SULBACTAM SODIUM 200 GRAM(S): 250; 125 INJECTION, POWDER, FOR SUSPENSION INTRAMUSCULAR; INTRAVENOUS at 23:10

## 2020-06-29 RX ADMIN — SODIUM CHLORIDE 75 MILLILITER(S): 9 INJECTION, SOLUTION INTRAVENOUS at 14:39

## 2020-06-29 RX ADMIN — Medication 150 MILLIGRAM(S): at 17:16

## 2020-06-29 NOTE — BRIEF OPERATIVE NOTE - COMMENTS
deep wound culture  bone culture base of the proximal phalanx  bone culture 5th met head    podiatry to see tomorrow, possible wound vac   bone/soft tissue pathology

## 2020-06-29 NOTE — BRIEF OPERATIVE NOTE - NSICDXBRIEFPREOP_GEN_ALL_CORE_FT
PRE-OP DIAGNOSIS:  Diabetic infection of left foot 29-Jun-2020 14:44:30  Sunny Tineo  Foot osteomyelitis 29-Jun-2020 14:44:16  Sunny Tineo

## 2020-06-29 NOTE — BRIEF OPERATIVE NOTE - NSICDXBRIEFPROCEDURE_GEN_ALL_CORE_FT
PROCEDURES:  Open resection of left metatarsal 29-Jun-2020 14:45:29 5th met head and base of the proximal phalanx left foot Sunny Tineo PROCEDURES:  Open biopsy, bone, foot 29-Jun-2020 14:53:40  Sunny Tineo  Irrigation and excisional debridement of tissue including subcutaneous tissue 29-Jun-2020 14:53:23  Sunny Tineo  Open resection of left metatarsal 29-Jun-2020 14:45:29 5th met head and base of the proximal phalanx left foot Sunny Tineo

## 2020-06-29 NOTE — CHART NOTE - NSCHARTNOTEFT_GEN_A_CORE
PACU ANESTHESIA ADMISSION NOTE      ____ Intubated  TV:______       Rate: ______      FiO2: ______    __x__ Patent Airway    __x__ Full return of protective reflexes    ____ Full recovery from anesthesia / sedation to baseline status    Vitals:  HR 84  BP 90/50  RR 15  O2sat. 96%  Temp: 36.5C      Mental Status:  _x___ Awake   _____ Alert   __x___ Drowsy   _____ Sedated    Nausea/Vomiting: ____ Yes, See Post - Op Orders      __x__ No    Pain Scale (0-10): __x___    Treatment: ____ None    __x__ See Post - Op/PCA Orders    Post - Operative Fluids:   ____ Oral   __x__ See Post - Op Orders    Plan:  Discharge to:   ____Home       ___x__Floor      _____Critical Care    _____ Other:_________________    Comments: s/p IV sedation. No anesthesia complications. Pt's condition is stable in PACU. Full report is given to PACU RN.

## 2020-06-29 NOTE — PROGRESS NOTE ADULT - SUBJECTIVE AND OBJECTIVE BOX
DAILY PROGRESS NOTE  ===========================================================    Patient Information:  RAD PARKS  /  54y  /  Male  /  MRN#: [id number]    Hospital Day: 4d     |:::::::::::::::::::::::::::| SUBJECTIVE |:::::::::::::::::::::::::::|    OVERNIGHT EVENTS: None  TODAY: Patient was seen today at bedside. No complaints. Ready for surgery today.    |:::::::::::::::::::::::::::| OBJECTIVE |:::::::::::::::::::::::::::|    VITAL SIGNS: Last 24 Hours  T(C): 36.1 (29 Jun 2020 04:47), Max: 36.1 (29 Jun 2020 04:47)  T(F): 96.9 (29 Jun 2020 04:47), Max: 96.9 (29 Jun 2020 04:47)  HR: 70 (29 Jun 2020 04:47) (70 - 79)  BP: 116/73 (29 Jun 2020 04:47) (106/57 - 116/73)  BP(mean): --  RR: 18 (29 Jun 2020 04:47) (17 - 18)  SpO2: 98% (28 Jun 2020 20:00) (98% - 98%)    PHYSICAL EXAM:  GENERAL:   Awake, alert; NAD.  HEENT:  Head NC/AT; Conjunctivae pink, Sclera anicteric; Oral mucosa moist.  CARDIO:   Regular rate; Regular rhythm; S1 & S2.  RESP:   No rales, wheezing, or rhonchi appreciated.  GI:   Soft; NT/ND; BS; No guarding; No rebound tenderness.  EXT:   Strength UE 5/5; Strength LE 5/5; No edema. Wound dressings on left and right feet.  SKIN:   Intact.    LAB RESULTS:                        13.5   5.88  )-----------( 252      ( 28 Jun 2020 06:28 )             39.4     06-28    140  |  99  |  17  ----------------------------<  154<H>  4.4   |  27  |  1.3    Ca    9.2      28 Jun 2020 06:28  Mg     2.0     06-28    TPro  7.0  /  Alb  4.2  /  TBili  0.6  /  DBili  x   /  AST  45<H>  /  ALT  54<H>  /  AlkPhos  92  06-28    PT/INR - ( 28 Jun 2020 17:35 )   PT: 12.30 sec;   INR: 1.07 ratio         PTT - ( 28 Jun 2020 17:35 )  PTT:36.0 sec    MICROBIOLOGY:    RADIOLOGY:    ALLERGIES: No Known Allergies      ===========================================================

## 2020-06-29 NOTE — BRIEF OPERATIVE NOTE - NSICDXBRIEFPOSTOP_GEN_ALL_CORE_FT
POST-OP DIAGNOSIS:  Foot osteomyelitis 29-Jun-2020 14:45:00  Sunny Tineo  Diabetic infection of left foot 29-Jun-2020 14:44:45  Sunny Tineo

## 2020-06-29 NOTE — BRIEF OPERATIVE NOTE - OPERATION/FINDINGS
necrotic bone 5th met head and base of the proximal phalanx left foot necrotic bone 5th met head and base of the proximal phalanx left foot  fibrotic soft tissue left foot

## 2020-06-30 LAB
ALBUMIN SERPL ELPH-MCNC: 4.2 G/DL — SIGNIFICANT CHANGE UP (ref 3.5–5.2)
ALP SERPL-CCNC: 90 U/L — SIGNIFICANT CHANGE UP (ref 30–115)
ALT FLD-CCNC: 75 U/L — HIGH (ref 0–41)
ANION GAP SERPL CALC-SCNC: 13 MMOL/L — SIGNIFICANT CHANGE UP (ref 7–14)
AST SERPL-CCNC: 45 U/L — HIGH (ref 0–41)
BASOPHILS # BLD AUTO: 0.03 K/UL — SIGNIFICANT CHANGE UP (ref 0–0.2)
BASOPHILS NFR BLD AUTO: 0.4 % — SIGNIFICANT CHANGE UP (ref 0–1)
BILIRUB SERPL-MCNC: 0.3 MG/DL — SIGNIFICANT CHANGE UP (ref 0.2–1.2)
BUN SERPL-MCNC: 16 MG/DL — SIGNIFICANT CHANGE UP (ref 10–20)
CALCIUM SERPL-MCNC: 9.4 MG/DL — SIGNIFICANT CHANGE UP (ref 8.5–10.1)
CHLORIDE SERPL-SCNC: 103 MMOL/L — SIGNIFICANT CHANGE UP (ref 98–110)
CO2 SERPL-SCNC: 25 MMOL/L — SIGNIFICANT CHANGE UP (ref 17–32)
CREAT SERPL-MCNC: 1.3 MG/DL — SIGNIFICANT CHANGE UP (ref 0.7–1.5)
CULTURE RESULTS: SIGNIFICANT CHANGE UP
EOSINOPHIL # BLD AUTO: 0.17 K/UL — SIGNIFICANT CHANGE UP (ref 0–0.7)
EOSINOPHIL NFR BLD AUTO: 2.3 % — SIGNIFICANT CHANGE UP (ref 0–8)
GLUCOSE SERPL-MCNC: 134 MG/DL — HIGH (ref 70–99)
GRAM STN FLD: SIGNIFICANT CHANGE UP
HCT VFR BLD CALC: 40.6 % — LOW (ref 42–52)
HGB BLD-MCNC: 13.6 G/DL — LOW (ref 14–18)
IMM GRANULOCYTES NFR BLD AUTO: 1.2 % — HIGH (ref 0.1–0.3)
LYMPHOCYTES # BLD AUTO: 1.57 K/UL — SIGNIFICANT CHANGE UP (ref 1.2–3.4)
LYMPHOCYTES # BLD AUTO: 21.5 % — SIGNIFICANT CHANGE UP (ref 20.5–51.1)
MAGNESIUM SERPL-MCNC: 2.1 MG/DL — SIGNIFICANT CHANGE UP (ref 1.8–2.4)
MCHC RBC-ENTMCNC: 28.2 PG — SIGNIFICANT CHANGE UP (ref 27–31)
MCHC RBC-ENTMCNC: 33.5 G/DL — SIGNIFICANT CHANGE UP (ref 32–37)
MCV RBC AUTO: 84.2 FL — SIGNIFICANT CHANGE UP (ref 80–94)
MONOCYTES # BLD AUTO: 0.61 K/UL — HIGH (ref 0.1–0.6)
MONOCYTES NFR BLD AUTO: 8.4 % — SIGNIFICANT CHANGE UP (ref 1.7–9.3)
NEUTROPHILS # BLD AUTO: 4.83 K/UL — SIGNIFICANT CHANGE UP (ref 1.4–6.5)
NEUTROPHILS NFR BLD AUTO: 66.2 % — SIGNIFICANT CHANGE UP (ref 42.2–75.2)
NRBC # BLD: 0 /100 WBCS — SIGNIFICANT CHANGE UP (ref 0–0)
ORGANISM # SPEC MICROSCOPIC CNT: SIGNIFICANT CHANGE UP
PLATELET # BLD AUTO: 260 K/UL — SIGNIFICANT CHANGE UP (ref 130–400)
POTASSIUM SERPL-MCNC: 4.8 MMOL/L — SIGNIFICANT CHANGE UP (ref 3.5–5)
POTASSIUM SERPL-SCNC: 4.8 MMOL/L — SIGNIFICANT CHANGE UP (ref 3.5–5)
PROT SERPL-MCNC: 7.2 G/DL — SIGNIFICANT CHANGE UP (ref 6–8)
RBC # BLD: 4.82 M/UL — SIGNIFICANT CHANGE UP (ref 4.7–6.1)
RBC # FLD: 13.4 % — SIGNIFICANT CHANGE UP (ref 11.5–14.5)
SODIUM SERPL-SCNC: 141 MMOL/L — SIGNIFICANT CHANGE UP (ref 135–146)
SPECIMEN SOURCE: SIGNIFICANT CHANGE UP
WBC # BLD: 7.3 K/UL — SIGNIFICANT CHANGE UP (ref 4.8–10.8)
WBC # FLD AUTO: 7.3 K/UL — SIGNIFICANT CHANGE UP (ref 4.8–10.8)

## 2020-06-30 RX ADMIN — AMPICILLIN SODIUM AND SULBACTAM SODIUM 200 GRAM(S): 250; 125 INJECTION, POWDER, FOR SUSPENSION INTRAMUSCULAR; INTRAVENOUS at 17:24

## 2020-06-30 RX ADMIN — Medication 150 MILLIGRAM(S): at 05:41

## 2020-06-30 RX ADMIN — AMLODIPINE BESYLATE 5 MILLIGRAM(S): 2.5 TABLET ORAL at 05:41

## 2020-06-30 RX ADMIN — AMPICILLIN SODIUM AND SULBACTAM SODIUM 200 GRAM(S): 250; 125 INJECTION, POWDER, FOR SUSPENSION INTRAMUSCULAR; INTRAVENOUS at 11:44

## 2020-06-30 RX ADMIN — CHLORHEXIDINE GLUCONATE 1 APPLICATION(S): 213 SOLUTION TOPICAL at 05:41

## 2020-06-30 RX ADMIN — LISINOPRIL 10 MILLIGRAM(S): 2.5 TABLET ORAL at 05:41

## 2020-06-30 RX ADMIN — Medication 150 MILLIGRAM(S): at 17:24

## 2020-06-30 RX ADMIN — ENOXAPARIN SODIUM 40 MILLIGRAM(S): 100 INJECTION SUBCUTANEOUS at 11:44

## 2020-06-30 RX ADMIN — Medication 150 MILLIGRAM(S): at 11:46

## 2020-06-30 RX ADMIN — Medication 1 APPLICATION(S): at 17:24

## 2020-06-30 RX ADMIN — Medication 1 APPLICATION(S): at 05:42

## 2020-06-30 RX ADMIN — AMPICILLIN SODIUM AND SULBACTAM SODIUM 200 GRAM(S): 250; 125 INJECTION, POWDER, FOR SUSPENSION INTRAMUSCULAR; INTRAVENOUS at 05:41

## 2020-06-30 NOTE — CONSULT NOTE ADULT - SUBJECTIVE AND OBJECTIVE BOX
RAD PARKS  MRN-[id number]    HISTORY OF PRESENT ILLNESS:  HPI:  Pt is a 55 yo male w/ PMHx of HTN, right nephrectomy, sciatica presenting to the ED w/ chronic ulcers in b/l feet. Patient was seen earlier by podiatrist, Dr. Saldaña, who noticed the b/l foot ulcers getting worse, ordered MRI on 06/15 which showed Left 5th MTP bone osteomyelitis suggesting of septic arthritis and Right 5th MTP chronic osteomyelitis. Pt reports Left foot ulcer has started last year and has progressively getting worse over the last month with stabbing 08/10 pain, increase in pink drainage, and redness of the skin. Pt's chronic Right foot chronic ulcer was first noticed 3 years ago and pt has been previously hospitalized w/ abx for osteomyelitis. Pt denies any fevers, chills, CP, palpitations, SOB, n/v/d, burning on urination, hematuria, or weight changes. (25 Jun 2020 15:08)    Dermatology consulted to evaluate chronic R foot rash. Pt reports rash has been there for 3 years since the L foot ulcer was significantly worse. Over last 3 years the rash has been improving with compression and topical antibiotic. Rash is 8cm x 4cm plaque involves dorsum of the foot, raised and blanchable. Denies itching, pain, warmth and discharge.      PMH/PSH:  PAST MEDICAL & SURGICAL HISTORY:  Back pain with sciatica  Numbness: legs  Umbilical hernia  Mild HTN  Sleep apnea: possible un diagnosed  PVD (peripheral vascular disease)  Peripheral neuropathy  Obesity  History of right nephrectomy: 02/2020    ALLERGIES:  Allergies    No Known Allergies    Intolerances      SOCIAL HABITS:    FAMILY HISTORY:   FAMILY HISTORY:  Family history of multiple myeloma: Dad  FH: prostate cancer: dad  FH: pulmonary embolism: dad  FH: heart attack: mom (age 44)      REVIEW OF SYSTEM:  Elements of review of systems are negative or non-applicable except as noted above in HPI section.       HOME MEDICATIONS:  acetaminophen 325 mg oral tablet  amlodipine-benazepril 5 mg-10 mg oral capsule  CoQ10 300 mg oral capsule  pregabalin 150 mg oral capsule  silver sulfADIAZINE 1% topical cream    MEDICATIONS:  MEDICATIONS  (STANDING):  amLODIPine   Tablet 5 milliGRAM(s) Oral daily  ampicillin/sulbactam  IVPB 3 Gram(s) IV Intermittent every 6 hours  chlorhexidine 4% Liquid 1 Application(s) Topical <User Schedule>  enoxaparin Injectable 40 milliGRAM(s) SubCutaneous daily  hydrocortisone 1% Cream 1 Application(s) Topical two times a day  lisinopril 10 milliGRAM(s) Oral daily  pregabalin 150 milliGRAM(s) Oral four times a day    MEDICATIONS  (PRN):        VITALS:   Vital Signs Last 24 Hrs  T(C): 36.4 (30 Jun 2020 13:12), Max: 36.7 (29 Jun 2020 15:30)  T(F): 97.6 (30 Jun 2020 13:12), Max: 98 (29 Jun 2020 15:30)  HR: 82 (30 Jun 2020 13:12) (20 - 83)  BP: 117/64 (30 Jun 2020 13:12) (86/52 - 142/67)  BP(mean): --  RR: 18 (30 Jun 2020 13:12) (11 - 23)  SpO2: 98% (30 Jun 2020 08:02) (93% - 99%)    PHYSICAL EXAM:    GENERAL: NAD, well-developed  HEAD:  Atraumatic, Normocephalic  NECK: Supple, No JVD  CHEST/LUNG: Clear to auscultation bilaterally; No wheeze  HEART: Regular rate and rhythm; No murmurs, rubs, or gallops  ABDOMEN: Soft, Nontender, Nondistended; Bowel sounds present  EXTREMITIES:  b/l LE wrapped. R foot ulcer is present on the planter of the foot  SKIN:  8cm x 4cm plaque involves dorsum of the foot, raised and blanchable      LABS:                        13.6   7.30  )-----------( 260      ( 30 Jun 2020 05:24 )             40.6     06-30    141  |  103  |  16  ----------------------------<  134<H>  4.8   |  25  |  1.3    Ca    9.4      30 Jun 2020 05:24  Mg     2.1     06-30    TPro  7.2  /  Alb  4.2  /  TBili  0.3  /  DBili  x   /  AST  45<H>  /  ALT  75<H>  /  AlkPhos  90  06-30    LIVER FUNCTIONS - ( 30 Jun 2020 05:24 )  Alb: 4.2 g/dL / Pro: 7.2 g/dL / ALK PHOS: 90 U/L / ALT: 75 U/L / AST: 45 U/L / GGT: x               PT/INR - ( 28 Jun 2020 17:35 )   PT: 12.30 sec;   INR: 1.07 ratio         PTT - ( 28 Jun 2020 17:35 )  PTT:36.0 sec    Culture - Tissue with Gram Stain (collected 06-29-20 @ 13:40)  Source: .Tissue Distal Marain Bone  Gram Stain (06-30-20 @ 04:41):    No polymorphonuclear leukocytes seen per low power field    No organisms seen    Culture - Tissue with Gram Stain (collected 06-29-20 @ 13:40)  Source: .Tissue None  Gram Stain (06-30-20 @ 04:46):    Rare polymorphonuclear leukocytes seen per low power field    No organisms seen    Culture - Tissue with Gram Stain (collected 06-29-20 @ 13:40)  Source: .Tissue None  Gram Stain (06-30-20 @ 01:24):    No polymorphonuclear leukocytes seen per low power field    No organisms seen    Culture - Abscess with Gram Stain (collected 06-25-20 @ 13:19)  Source: .Abscess Left 5th metatarsal  Preliminary Report (06-27-20 @ 20:59):    Moderate Staphylococcus aureus    Few Enterococcus faecalis  Organism: Staphylococcus aureus  Enterococcus faecalis (06-28-20 @ 18:16)  Organism: Enterococcus faecalis (06-28-20 @ 18:16)      -  Ampicillin: S <=2 Predicts results to ampicillin/sulbactam, amoxacillin-clavulanate and  piperacillin-tazobactam.      -  Tetra/Doxy: S <=1      -  Vancomycin: S 2      Method Type: CINDY  Organism: Staphylococcus aureus (06-27-20 @ 19:11)      -  Ampicillin/Sulbactam: S <=8/4      -  Cefazolin: S <=4      -  Clindamycin: S <=0.25      -  Erythromycin: S <=0.25      -  Gentamicin: S <=1 Should not be used as monotherapy      -  Oxacillin: S 0.5      -  Penicillin: R 4      -  RIF- Rifampin: S <=1 Should not be used as monotherapy      -  Tetra/Doxy: S <=1      -  Trimethoprim/Sulfamethoxazole: S <=0.5/9.5      -  Vancomycin: S 1      Method Type: CINDY    Culture - Blood (collected 06-25-20 @ 10:50)  Source: .Blood Blood  Preliminary Report (06-26-20 @ 17:01):    No growth to date.    Culture - Blood (collected 06-25-20 @ 10:50)  Source: .Blood Blood  Preliminary Report (06-26-20 @ 17:01):    No growth to date.

## 2020-06-30 NOTE — PROGRESS NOTE ADULT - SUBJECTIVE AND OBJECTIVE BOX
RAD PARKS  54y, Male    All available historical data reviewed    OVERNIGHT EVENTS:  PROCEDURES:  Open biopsy, bone, foot 29-Jun-2020 14:53:40  Sunny Tineo  Irrigation and excisional debridement of tissue including subcutaneous tissue 29-Jun-2020 14:53:23  Sunny Tineo  Open resection of left metatarsal 29-Jun-2020 14:45:29 5th met head and base of the proximal phalanx left foot Sunny Tineo.    ROS:  General: Denies rigors, nightsweats  HEENT: Denies headache, rhinorrhea, sore throat, eye pain  CV: Denies CP, palpitations  PULM: Denies wheezing, hemoptysis  GI: Denies hematemesis, hematochezia, melena  : Denies discharge, hematuria  MSK: Denies arthralgias, myalgias  SKIN: Denies rash, lesions  NEURO: Denies paresthesias, weakness  PSYCH: Denies depression, anxiety    VITALS:  T(F): 97.6, Max: 98 (06-29-20 @ 15:30)  HR: 82  BP: 117/64  RR: 18Vital Signs Last 24 Hrs  T(C): 36.4 (30 Jun 2020 13:12), Max: 36.7 (29 Jun 2020 15:30)  T(F): 97.6 (30 Jun 2020 13:12), Max: 98 (29 Jun 2020 15:30)  HR: 82 (30 Jun 2020 13:12) (20 - 83)  BP: 117/64 (30 Jun 2020 13:12) (86/52 - 142/67)  BP(mean): --  RR: 18 (30 Jun 2020 13:12) (11 - 23)  SpO2: 98% (30 Jun 2020 08:02) (93% - 99%)    TESTS & MEASUREMENTS:                        13.6   7.30  )-----------( 260      ( 30 Jun 2020 05:24 )             40.6     06-30    141  |  103  |  16  ----------------------------<  134<H>  4.8   |  25  |  1.3    Ca    9.4      30 Jun 2020 05:24  Mg     2.1     06-30    TPro  7.2  /  Alb  4.2  /  TBili  0.3  /  DBili  x   /  AST  45<H>  /  ALT  75<H>  /  AlkPhos  90  06-30    LIVER FUNCTIONS - ( 30 Jun 2020 05:24 )  Alb: 4.2 g/dL / Pro: 7.2 g/dL / ALK PHOS: 90 U/L / ALT: 75 U/L / AST: 45 U/L / GGT: x             Culture - Tissue with Gram Stain (collected 06-29-20 @ 13:40)  Source: .Tissue Distal Marain Bone  Gram Stain (06-30-20 @ 04:41):    No polymorphonuclear leukocytes seen per low power field    No organisms seen    Culture - Tissue with Gram Stain (collected 06-29-20 @ 13:40)  Source: .Tissue None  Gram Stain (06-30-20 @ 04:46):    Rare polymorphonuclear leukocytes seen per low power field    No organisms seen    Culture - Tissue with Gram Stain (collected 06-29-20 @ 13:40)  Source: .Tissue None  Gram Stain (06-30-20 @ 01:24):    No polymorphonuclear leukocytes seen per low power field    No organisms seen    Culture - Abscess with Gram Stain (collected 06-25-20 @ 13:19)  Source: .Abscess Left 5th metatarsal  Preliminary Report (06-27-20 @ 20:59):    Moderate Staphylococcus aureus    Few Enterococcus faecalis  Organism: Staphylococcus aureus  Enterococcus faecalis (06-28-20 @ 18:16)  Organism: Enterococcus faecalis (06-28-20 @ 18:16)      -  Ampicillin: S <=2 Predicts results to ampicillin/sulbactam, amoxacillin-clavulanate and  piperacillin-tazobactam.      -  Tetra/Doxy: S <=1      -  Vancomycin: S 2      Method Type: CINDY  Organism: Staphylococcus aureus (06-27-20 @ 19:11)      -  Ampicillin/Sulbactam: S <=8/4      -  Cefazolin: S <=4      -  Clindamycin: S <=0.25      -  Erythromycin: S <=0.25      -  Gentamicin: S <=1 Should not be used as monotherapy      -  Oxacillin: S 0.5      -  Penicillin: R 4      -  RIF- Rifampin: S <=1 Should not be used as monotherapy      -  Tetra/Doxy: S <=1      -  Trimethoprim/Sulfamethoxazole: S <=0.5/9.5      -  Vancomycin: S 1      Method Type: CINDY    Culture - Blood (collected 06-25-20 @ 10:50)  Source: .Blood Blood  Preliminary Report (06-26-20 @ 17:01):    No growth to date.    Culture - Blood (collected 06-25-20 @ 10:50)  Source: .Blood Blood  Preliminary Report (06-26-20 @ 17:01):    No growth to date.            RADIOLOGY & ADDITIONAL TESTS:  Personal review of radiological diagnostics performed  Echo and EKG results noted when applicable.     MEDICATIONS:  amLODIPine   Tablet 5 milliGRAM(s) Oral daily  ampicillin/sulbactam  IVPB 3 Gram(s) IV Intermittent every 6 hours  chlorhexidine 4% Liquid 1 Application(s) Topical <User Schedule>  enoxaparin Injectable 40 milliGRAM(s) SubCutaneous daily  hydrocortisone 1% Cream 1 Application(s) Topical two times a day  lisinopril 10 milliGRAM(s) Oral daily  pregabalin 150 milliGRAM(s) Oral four times a day      ANTIBIOTICS:  ampicillin/sulbactam  IVPB 3 Gram(s) IV Intermittent every 6 hours

## 2020-06-30 NOTE — PROGRESS NOTE ADULT - ASSESSMENT
ASSESSMENT  Patient is a 55 yo male w/ PMHx of Peripheral Neuropathy, HTN, right nephrectomy, sciatica presented to the ED w/ chronic ulcers in b/l feet.      IMPRESSION  # Osteomyelitis of Left 5th Metatarsal Bone with surrounding cellulitis and septic arthritis secondary to Chronic Nonhealing Ulcer on Plantar Surface of Left foot    PROCEDURES: 6/29  Open biopsy, bone  Irrigation and excisional debridement of tissue including subcutaneous tissue  Open resection of left metatarsal 5th met head and base of the proximal phalanx left foot    # Chronic Osteomyelitis of Right 5th Metatarsal Bone secondary to Chronic Nonhealing Ulcer on Plantar surface of Right foot    - No recent changes    RECOMMENDATIONS  f/u tissue cultures 6/29  Unasyn 3 gm iv q6h

## 2020-06-30 NOTE — CONSULT NOTE ADULT - ATTENDING COMMENTS
resident interviewed patient and forwarded photos showing inflamed erythematous plaque lateral and dorsum right foot adjacent to ulcer.  Patient reports improvement in skin lesion with treat ment of ulcer.      Recommend treatment of ulcer by podiatry.  Podiatrist may biopsy inflammatory plaque if it does not resolve with treatment of the ulcer.

## 2020-06-30 NOTE — PROGRESS NOTE ADULT - ASSESSMENT
55 y/o M with hx of PVD, HTN, R nephrectomy, sciatica presenting with chronic foot ulcers, osteomyelitis, and septic arthritis.     #Septic arthritis of L 5th toe:   - ID on board  - Podiatry following, requests to keep pt till Friday  - HD stable  - Continue Unasyn 3 IV q6h post-op  - PICC line placement for at home abx  - Wound vac placement  - Derm c/l placed for rash on R foot    # PVD:   - Duplex on 6/25 showed stenosis in the arteries of lower extremity and mild to mod artherosclerotic disease occlusive diseases noted in the L popliteal and posterior tibial arteries  - Vascular surgery consulted f/u after surgery     # s/p R nephrectomy  - renal function stable  - BUN 16 (6/30) Cr 1.3 (6/30) eGFR 61 (6/30)     #HTN:  - BP stable  - Continue home BP meds, amlodipine 5mg  - Takes benzapril 10mg at home will give Lisinopril 10mg    #Sciatica  - Pregabalin 150mg    DVT ppx: Enoxaparin 40mg  Diet: DASH, Sodium and cholesterol restricted  Activity: Weight bearing as tolerated  Code: Full code 53 y/o M with hx of PVD, HTN, R nephrectomy, sciatica presenting with chronic foot ulcers, osteomyelitis, and septic arthritis.     #Septic arthritis of L 5th toe:   - ID on board, appreciate recs after culture  - Podiatry following, requests to keep pt till Friday  - HD stable  - Continue Unasyn 3 IV q6h post-op  - PICC line placement for at home abx  - Wound vac placement at discharge  - Derm c/l placed for rash on R foot    # PVD:   - Duplex on 6/25 showed stenosis in the arteries of lower extremity and mild to mod artherosclerotic disease occlusive diseases noted in the L popliteal and posterior tibial arteries  - Vascular surgery consulted f/u after surgery     # s/p R nephrectomy  - renal function stable  - BUN 16 (6/30) Cr 1.3 (6/30) eGFR 61 (6/30)     #HTN:  - BP stable  - Continue home BP meds, amlodipine 5mg  - Takes benzapril 10mg at home will give Lisinopril 10mg    #Sciatica  - Pregabalin 150mg    DVT ppx: Enoxaparin 40mg  Diet: DASH, Sodium and cholesterol restricted  Activity: Weight bearing as tolerated  Code: Full code

## 2020-06-30 NOTE — PROGRESS NOTE ADULT - SUBJECTIVE AND OBJECTIVE BOX
Patient was seen and examined. Spoke with RN. Chart reviewed.  No events overnight.  Vital Signs Last 24 Hrs  T(F): 96.2 (30 Jun 2020 05:07), Max: 98 (29 Jun 2020 15:30)  HR: 20 (30 Jun 2020 05:07) (20 - 83)  BP: 142/67 (30 Jun 2020 05:07) (86/52 - 142/67)  SpO2: 98% (30 Jun 2020 08:02) (93% - 99%)  MEDICATIONS  (STANDING):  amLODIPine   Tablet 5 milliGRAM(s) Oral daily  ampicillin/sulbactam  IVPB 3 Gram(s) IV Intermittent every 6 hours  chlorhexidine 4% Liquid 1 Application(s) Topical <User Schedule>  enoxaparin Injectable 40 milliGRAM(s) SubCutaneous daily  hydrocortisone 1% Cream 1 Application(s) Topical two times a day  lisinopril 10 milliGRAM(s) Oral daily  pregabalin 150 milliGRAM(s) Oral four times a day    MEDICATIONS  (PRN):    Labs:                        13.6   7.30  )-----------( 260      ( 30 Jun 2020 05:24 )             40.6     30 Jun 2020 05:24    141    |  103    |  16     ----------------------------<  134    4.8     |  25     |  1.3      Ca    9.4        30 Jun 2020 05:24  Mg     2.1       30 Jun 2020 05:24    TPro  7.2    /  Alb  4.2    /  TBili  0.3    /  DBili  x      /  AST  45     /  ALT  75     /  AlkPhos  90     30 Jun 2020 05:24    PT/INR - ( 28 Jun 2020 17:35 )   PT: 12.30 sec;   INR: 1.07 ratio         PTT - ( 28 Jun 2020 17:35 )  PTT:36.0 sec      Culture - Tissue with Gram Stain (collected 29 Jun 2020 13:40)  Source: .Tissue Distal Marain Bone  Gram Stain (30 Jun 2020 04:41):    No polymorphonuclear leukocytes seen per low power field    No organisms seen    Culture - Tissue with Gram Stain (collected 29 Jun 2020 13:40)  Source: .Tissue None  Gram Stain (30 Jun 2020 04:46):    Rare polymorphonuclear leukocytes seen per low power field    No organisms seen    Culture - Tissue with Gram Stain (collected 29 Jun 2020 13:40)  Source: .Tissue None  Gram Stain (30 Jun 2020 01:24):    No polymorphonuclear leukocytes seen per low power field    No organisms seen      General: comfortable, NAD  Neurology: A&Ox3, nonfocal  Head:  Normocephalic, atraumatic  ENT:  Mucosa moist, no ulcerations  Neck:  Supple, no JVD,   Skin: no breakdowns (as per RN)  Resp: CTA B/L  CV: RRR, S1S2,   GI: Soft, NT, bowel sounds  MS: B/L feet bandaged      A/P:  54y old  man with b/l foot ulcers w/ septic arthritis in fifth MTP on the left foot and chronic osteomyelitis of right fifth MTP on right foot.    IV abx as per ID    PICC line    wound care    OR today     glycemic control    plan as per podiatry    weigh bearing status TBD as per podiatry    possible DC on Friday    DVT prophylaxis  Decubitus prevention- all measures as per RN protocol  Please call or text me with any questions or updates

## 2020-06-30 NOTE — PROGRESS NOTE ADULT - SUBJECTIVE AND OBJECTIVE BOX
Podiatry Consult Note    Subjective:  RAD PARKS is a pleasant well-nourished, well developed 54y Male in no acute distress, alert awake, and oriented to person, place and time.   Patient is a 54y old  Male who presents with a chief complaint of Pt admitted for b/l foot ulcers w/ septic arthritis in fifth MTP on the left foot and chronic osteomyelitis of right fifth MTP on right foot. (29 Jun 2020 09:09)    HPI:  Pt is a 53 yo male w/ PMHx of HTN, right nephrectomy, sciatica presenting to the ED w/ chronic ulcers in b/l feet. Patient was seen earlier by podiatrist, Dr. Saldaña, who noticed the b/l foot ulcers getting worse, ordered MRI on 06/15 which showed Left 5th MTP bone osteomyelitis suggesting of septic arthritis and Right 5th MTP chronic osteomyelitis. Pt reports Left foot ulcer has started last year and has progressively getting worse over the last month with stabbing 08/10 pain, increase in pink drainage, and redness of the skin. Pt's chronic Right foot chronic ulcer was first noticed 3 years ago and pt has been previously hospitalized w/ abx for osteomyelitis. Pt denies any fevers, chills, CP, palpitations, SOB, n/v/d, burning on urination, hematuria, or weight changes. (25 Jun 2020 15:08)    PAST MEDICAL & SURGICAL HISTORY:  Back pain with sciatica  Numbness: legs  Umbilical hernia  Mild HTN  Sleep apnea: possible un diagnosed  PVD (peripheral vascular disease)  Peripheral neuropathy  Obesity  History of right nephrectomy: 02/2020     Objective:  Vital Signs Last 24 Hrs  T(C): 35.7 (30 Jun 2020 05:07), Max: 36.7 (29 Jun 2020 15:30)  T(F): 96.2 (30 Jun 2020 05:07), Max: 98 (29 Jun 2020 15:30)  HR: 20 (30 Jun 2020 05:07) (20 - 83)  BP: 142/67 (30 Jun 2020 05:07) (86/52 - 142/67)  BP(mean): --  RR: 18 (30 Jun 2020 05:07) (11 - 23)  SpO2: 98% (30 Jun 2020 08:02) (93% - 99%)                        13.6   7.30  )-----------( 260      ( 30 Jun 2020 05:24 )             40.6                 06-30    141  |  103  |  16  ----------------------------<  134<H>  4.8   |  25  |  1.3    Ca    9.4      30 Jun 2020 05:24  Mg     2.1     06-30    TPro  7.2  /  Alb  4.2  /  TBili  0.3  /  DBili  x   /  AST  45<H>  /  ALT  75<H>  /  AlkPhos  90  06-30      Culture - Tissue with Gram Stain (collected 06-29-20 @ 13:40)  Source: .Tissue Distal Marain Bone  Gram Stain (06-30-20 @ 04:41):    No polymorphonuclear leukocytes seen per low power field    No organisms seen    Culture - Tissue with Gram Stain (collected 06-29-20 @ 13:40)  Source: .Tissue None  Gram Stain (06-30-20 @ 04:46):    Rare polymorphonuclear leukocytes seen per low power field    No organisms seen    Culture - Tissue with Gram Stain (collected 06-29-20 @ 13:40)  Source: .Tissue None  Gram Stain (06-30-20 @ 01:24):    No polymorphonuclear leukocytes seen per low power field    No organisms seen    Physical Exam - Lower Extremity Focused:   Derm:   #Left:   Surgical Dressing Intact; Clean, Dry; No Strikethrough Noted;    #Right  Lateral Aspect of Midfoot; Mild erythema; No Open Wound   Plantar Aspect; Open Ulcer of the 4th metatarsal w/ fibrotic periwound, Probes Deep to Bone;     Vasc: DP & PT diminished on right foot.    Assessment:  S/p left partial 5th met head and base of resection; 6/29  Open Ulcer on the 4th Metatarsal Plantar Aspect;   Erythema on the Lateral Midfoot; Right Foot;     Plan:  Chart reviewed and Patient evaluated. All Questions and Concerns Addressed and Answered  Discussed diagnosis and treatment with patient  Local Wound Care; Betadine 4th Plantar Aspect; Q24; Right Foot;   Request Derm Consult; Right Lateral Midfoot; Chronic Mild Erythema; No Resolve  Will Leave Surgical Dressing Intact Until Wednesday 7/1; Possible Wound Vac Application;   Anticipate Discharge on Friday 7/3; As Per Attending;  Will Continue to Monitor Patient While Admitted;    Discussed Plan w/ attending, Dr. Saldaña.     Podiatry [phone]

## 2020-06-30 NOTE — PROGRESS NOTE ADULT - SUBJECTIVE AND OBJECTIVE BOX
55 y/o male with PMH of HTN, right nephrectomy, PVD, and sciatica presenting to the ED with worsening of chronic left foot ulcer, increased pink fluid drainage, and erythema. Pt was admitted for surgical debridement for left foot osteomyelitis, abx were held until after surgery. Pt was taken to the OR yesterday by Podiatry.     Overnight: Pt was stable with no acute complaints.     This morning pt is doing well with mild tolerable pain from the debridement and c/o a rash on the right foot.     Vitals:   Temp: 96.2F  BP: 142/67  HR: 66  RR: 18  SpO2 %: 96    PHYSICAL EXAM:  GENERAL:   Awake, alert; NAD.  HEENT:  Head NC/AT; Conjunctivae pink, Sclera anicteric; Oral mucosa moist.  CARDIO:   Regular rate; Regular rhythm; S1 & S2.  RESP:   No rales, wheezing, or rhonchi appreciated.  GI:   Soft; NT/ND; BS; No guarding; No rebound tenderness.  EXT:   Strength UE 5/5; Strength LE 5/5; No edema. Wound dressings on left and right feet.  SKIN:   Intact. Rash on R foot.     LAB RESULTS:                        13.6  7.3  )-----------( 260      ( 30 Jun 2020 05:24 )             40.6     06-30    141  |  103  |  16  ----------------------------<  134<H>  4.8   |  25  |  1.3    Ca    9.4      30 Jun 2020 05:24  Mg     2.1     06-30    TPro  7.2  /  Alb  4.2  /  TBili  0.3  /  DBili  x   /  AST  45<H>  /  ALT  75<H>  /  AlkPhos  90  06-30    PT/INR - ( 28 Jun 2020 17:35 )   PT: 12.30 sec;   INR: 1.07 ratio         PTT - ( 28 Jun 2020 17:35 )  PTT:36.0 sec    MICROBIOLOGY:    RADIOLOGY:    ALLERGIES: No Known Allergies

## 2020-07-01 LAB
-  AMPICILLIN/SULBACTAM: SIGNIFICANT CHANGE UP
-  AMPICILLIN/SULBACTAM: SIGNIFICANT CHANGE UP
-  CEFAZOLIN: SIGNIFICANT CHANGE UP
-  CEFAZOLIN: SIGNIFICANT CHANGE UP
-  CLINDAMYCIN: SIGNIFICANT CHANGE UP
-  CLINDAMYCIN: SIGNIFICANT CHANGE UP
-  ERYTHROMYCIN: SIGNIFICANT CHANGE UP
-  ERYTHROMYCIN: SIGNIFICANT CHANGE UP
-  GENTAMICIN: SIGNIFICANT CHANGE UP
-  GENTAMICIN: SIGNIFICANT CHANGE UP
-  OXACILLIN: SIGNIFICANT CHANGE UP
-  OXACILLIN: SIGNIFICANT CHANGE UP
-  PENICILLIN: SIGNIFICANT CHANGE UP
-  PENICILLIN: SIGNIFICANT CHANGE UP
-  RIFAMPIN: SIGNIFICANT CHANGE UP
-  RIFAMPIN: SIGNIFICANT CHANGE UP
-  TETRACYCLINE: SIGNIFICANT CHANGE UP
-  TETRACYCLINE: SIGNIFICANT CHANGE UP
-  TRIMETHOPRIM/SULFAMETHOXAZOLE: SIGNIFICANT CHANGE UP
-  TRIMETHOPRIM/SULFAMETHOXAZOLE: SIGNIFICANT CHANGE UP
-  VANCOMYCIN: SIGNIFICANT CHANGE UP
-  VANCOMYCIN: SIGNIFICANT CHANGE UP
ALBUMIN SERPL ELPH-MCNC: 4.1 G/DL — SIGNIFICANT CHANGE UP (ref 3.5–5.2)
ALP SERPL-CCNC: 85 U/L — SIGNIFICANT CHANGE UP (ref 30–115)
ALT FLD-CCNC: 56 U/L — HIGH (ref 0–41)
ANION GAP SERPL CALC-SCNC: 10 MMOL/L — SIGNIFICANT CHANGE UP (ref 7–14)
AST SERPL-CCNC: 26 U/L — SIGNIFICANT CHANGE UP (ref 0–41)
BILIRUB SERPL-MCNC: 0.2 MG/DL — SIGNIFICANT CHANGE UP (ref 0.2–1.2)
BUN SERPL-MCNC: 16 MG/DL — SIGNIFICANT CHANGE UP (ref 10–20)
CALCIUM SERPL-MCNC: 9.3 MG/DL — SIGNIFICANT CHANGE UP (ref 8.5–10.1)
CHLORIDE SERPL-SCNC: 101 MMOL/L — SIGNIFICANT CHANGE UP (ref 98–110)
CO2 SERPL-SCNC: 27 MMOL/L — SIGNIFICANT CHANGE UP (ref 17–32)
CREAT SERPL-MCNC: 1.4 MG/DL — SIGNIFICANT CHANGE UP (ref 0.7–1.5)
GLUCOSE BLDC GLUCOMTR-MCNC: 133 MG/DL — HIGH (ref 70–99)
GLUCOSE SERPL-MCNC: 138 MG/DL — HIGH (ref 70–99)
HCT VFR BLD CALC: 39.6 % — LOW (ref 42–52)
HGB BLD-MCNC: 12.9 G/DL — LOW (ref 14–18)
MAGNESIUM SERPL-MCNC: 2.1 MG/DL — SIGNIFICANT CHANGE UP (ref 1.8–2.4)
MCHC RBC-ENTMCNC: 28 PG — SIGNIFICANT CHANGE UP (ref 27–31)
MCHC RBC-ENTMCNC: 32.6 G/DL — SIGNIFICANT CHANGE UP (ref 32–37)
MCV RBC AUTO: 85.9 FL — SIGNIFICANT CHANGE UP (ref 80–94)
METHOD TYPE: SIGNIFICANT CHANGE UP
METHOD TYPE: SIGNIFICANT CHANGE UP
NRBC # BLD: 0 /100 WBCS — SIGNIFICANT CHANGE UP (ref 0–0)
PLATELET # BLD AUTO: 250 K/UL — SIGNIFICANT CHANGE UP (ref 130–400)
POTASSIUM SERPL-MCNC: 5.1 MMOL/L — HIGH (ref 3.5–5)
POTASSIUM SERPL-SCNC: 5.1 MMOL/L — HIGH (ref 3.5–5)
PROT SERPL-MCNC: 6.7 G/DL — SIGNIFICANT CHANGE UP (ref 6–8)
RBC # BLD: 4.61 M/UL — LOW (ref 4.7–6.1)
RBC # FLD: 13.8 % — SIGNIFICANT CHANGE UP (ref 11.5–14.5)
SODIUM SERPL-SCNC: 138 MMOL/L — SIGNIFICANT CHANGE UP (ref 135–146)
WBC # BLD: 5.3 K/UL — SIGNIFICANT CHANGE UP (ref 4.8–10.8)
WBC # FLD AUTO: 5.3 K/UL — SIGNIFICANT CHANGE UP (ref 4.8–10.8)

## 2020-07-01 PROCEDURE — 73630 X-RAY EXAM OF FOOT: CPT | Mod: 26,LT

## 2020-07-01 RX ADMIN — Medication 150 MILLIGRAM(S): at 05:28

## 2020-07-01 RX ADMIN — AMPICILLIN SODIUM AND SULBACTAM SODIUM 200 GRAM(S): 250; 125 INJECTION, POWDER, FOR SUSPENSION INTRAMUSCULAR; INTRAVENOUS at 17:09

## 2020-07-01 RX ADMIN — CHLORHEXIDINE GLUCONATE 1 APPLICATION(S): 213 SOLUTION TOPICAL at 05:27

## 2020-07-01 RX ADMIN — Medication 150 MILLIGRAM(S): at 11:11

## 2020-07-01 RX ADMIN — AMPICILLIN SODIUM AND SULBACTAM SODIUM 200 GRAM(S): 250; 125 INJECTION, POWDER, FOR SUSPENSION INTRAMUSCULAR; INTRAVENOUS at 05:28

## 2020-07-01 RX ADMIN — AMPICILLIN SODIUM AND SULBACTAM SODIUM 200 GRAM(S): 250; 125 INJECTION, POWDER, FOR SUSPENSION INTRAMUSCULAR; INTRAVENOUS at 11:11

## 2020-07-01 RX ADMIN — ENOXAPARIN SODIUM 40 MILLIGRAM(S): 100 INJECTION SUBCUTANEOUS at 11:10

## 2020-07-01 RX ADMIN — Medication 150 MILLIGRAM(S): at 00:49

## 2020-07-01 RX ADMIN — Medication 1 APPLICATION(S): at 17:10

## 2020-07-01 RX ADMIN — AMPICILLIN SODIUM AND SULBACTAM SODIUM 200 GRAM(S): 250; 125 INJECTION, POWDER, FOR SUSPENSION INTRAMUSCULAR; INTRAVENOUS at 23:38

## 2020-07-01 RX ADMIN — AMPICILLIN SODIUM AND SULBACTAM SODIUM 200 GRAM(S): 250; 125 INJECTION, POWDER, FOR SUSPENSION INTRAMUSCULAR; INTRAVENOUS at 00:48

## 2020-07-01 RX ADMIN — Medication 150 MILLIGRAM(S): at 17:09

## 2020-07-01 RX ADMIN — Medication 150 MILLIGRAM(S): at 23:38

## 2020-07-01 RX ADMIN — LISINOPRIL 10 MILLIGRAM(S): 2.5 TABLET ORAL at 05:28

## 2020-07-01 RX ADMIN — AMLODIPINE BESYLATE 5 MILLIGRAM(S): 2.5 TABLET ORAL at 05:28

## 2020-07-01 RX ADMIN — Medication 1 APPLICATION(S): at 05:28

## 2020-07-01 NOTE — PROGRESS NOTE ADULT - SUBJECTIVE AND OBJECTIVE BOX
Podiatry Progress Note    Subjective:   RAD PARKS is a pleasant well-nourished, well developed 54y Male in no acute distress, alert awake, and oriented to person, place and time.  Patient is a 54y old  Male who presents with a chief complaint of Pt admitted for b/l foot ulcers w/ septic arthritis in fifth MTP on the left foot and chronic osteomyelitis of right fifth MTP on right foot. (01 Jul 2020 14:31)    PAST MEDICAL & SURGICAL HISTORY:  Back pain with sciatica  Numbness: legs  Umbilical hernia  Mild HTN  Sleep apnea: possible un diagnosed  PVD (peripheral vascular disease)  Peripheral neuropathy  Obesity  History of right nephrectomy: 02/2020     Objective:  Vital Signs Last 24 Hrs  T(C): 36.2 (01 Jul 2020 14:03), Max: 36.7 (01 Jul 2020 05:09)  T(F): 97.1 (01 Jul 2020 14:03), Max: 98.1 (01 Jul 2020 05:09)  HR: 76 (01 Jul 2020 14:03) (69 - 82)  BP: 115/65 (01 Jul 2020 14:03) (96/61 - 115/65)  BP(mean): --  RR: 20 (01 Jul 2020 14:03) (18 - 20)  SpO2: 92% (01 Jul 2020 07:30) (92% - 97%)                        12.9   5.30  )-----------( 250      ( 01 Jul 2020 05:46 )             39.6                 07-01    138  |  101  |  16  ----------------------------<  138<H>  5.1<H>   |  27  |  1.4    Ca    9.3      01 Jul 2020 05:46  Mg     2.1     07-01    TPro  6.7  /  Alb  4.1  /  TBili  0.2  /  DBili  x   /  AST  26  /  ALT  56<H>  /  AlkPhos  85  07-01    Physical Exam - Lower Extremity Focused:   Derm:   #Left:   Open wound; plantar 5th metatarsal head  Sutures intact on the distal and proximal aspect of wound  Stable; no sign of wound dehiscence     #Right  Lateral Aspect of Midfoot; Mild erythema; No Open Wound   Plantar Aspect; Open Ulcer of the 4th metatarsal w/ fibrotic periwound, Probes Deep to Bone;     Vasc: DP & PT diminished on right foot.    Assessment:  S/p left partial 5th met head and base of resection; 6/29  Open Ulcer on the 4th Metatarsal Plantar Aspect;   Erythema on the Lateral Midfoot; Right Foot;     Plan:  Chart reviewed and Patient evaluated. All Questions and Concerns Addressed and Answered  Discussed diagnosis and treatment with patient  Local Wound Care; Left foot: flush w/ normal saline; hydrogel / adaptic / kerlix / ACE Right foot: Betadine / DSD / Kerlix; Q24 Dressing changes;   Request VNS; Portable HomeVac Paperwork Filled and Submitted;   Patient Will be Discharged w/ Wet To Dry; Wound Vac To be Applied by VNS;  Patient Stable Per Podiatry Standpoint; Will Need Follow Up w/ Dr. Saldaña; One Week Post Discharge;  Discussed Plan w/ Dr. Saldaña;    Podiatry

## 2020-07-01 NOTE — PROGRESS NOTE ADULT - SUBJECTIVE AND OBJECTIVE BOX
Patient was seen and examined. Spoke with RN. Chart reviewed.  No events overnight.  Vital Signs Last 24 Hrs  T(F): 98.1 (01 Jul 2020 05:09), Max: 98.1 (01 Jul 2020 05:09)  HR: 69 (01 Jul 2020 05:09) (69 - 82)  BP: 100/68 (01 Jul 2020 05:09) (96/61 - 117/64)  SpO2: 92% (01 Jul 2020 07:30) (92% - 97%)  MEDICATIONS  (STANDING):  amLODIPine   Tablet 5 milliGRAM(s) Oral daily  ampicillin/sulbactam  IVPB 3 Gram(s) IV Intermittent every 6 hours  chlorhexidine 4% Liquid 1 Application(s) Topical <User Schedule>  enoxaparin Injectable 40 milliGRAM(s) SubCutaneous daily  hydrocortisone 1% Cream 1 Application(s) Topical two times a day  lisinopril 10 milliGRAM(s) Oral daily  pregabalin 150 milliGRAM(s) Oral four times a day    MEDICATIONS  (PRN):    Labs:                        12.9   5.30  )-----------( 250      ( 01 Jul 2020 05:46 )             39.6                         13.6   7.30  )-----------( 260      ( 30 Jun 2020 05:24 )             40.6     01 Jul 2020 05:46    138    |  101    |  16     ----------------------------<  138    5.1     |  27     |  1.4    30 Jun 2020 05:24    141    |  103    |  16     ----------------------------<  134    4.8     |  25     |  1.3      Ca    9.3        01 Jul 2020 05:46  Ca    9.4        30 Jun 2020 05:24  Mg     2.1       01 Jul 2020 05:46  Mg     2.1       30 Jun 2020 05:24    TPro  6.7    /  Alb  4.1    /  TBili  0.2    /  DBili  x      /  AST  26     /  ALT  56     /  AlkPhos  85     01 Jul 2020 05:46  TPro  7.2    /  Alb  4.2    /  TBili  0.3    /  DBili  x      /  AST  45     /  ALT  75     /  AlkPhos  90     30 Jun 2020 05:24          Culture - Tissue with Gram Stain (collected 29 Jun 2020 13:40)  Source: .Tissue Distal Marain Bone  Gram Stain (30 Jun 2020 04:41):    No polymorphonuclear leukocytes seen per low power field    No organisms seen  Preliminary Report (30 Jun 2020 20:30):    Few Staphylococcus aureus    Culture - Tissue with Gram Stain (collected 29 Jun 2020 13:40)  Source: .Tissue None  Gram Stain (30 Jun 2020 04:46):    Rare polymorphonuclear leukocytes seen per low power field    No organisms seen  Preliminary Report (30 Jun 2020 20:35):    Few Staphylococcus aureus    Culture - Tissue with Gram Stain (collected 29 Jun 2020 13:40)  Source: .Tissue None  Gram Stain (30 Jun 2020 01:24):    No polymorphonuclear leukocytes seen per low power field    No organisms seen  Preliminary Report (30 Jun 2020 21:30):    Rare Staphylococcus aureus    See previous culture [id number]    Culture - Surgical Swab (collected 29 Jun 2020 13:40)  Source: .Surgical Swab None  Preliminary Report (30 Jun 2020 20:58):    Numerous Staphylococcus aureus    See previous culture [id number]      General: comfortable, NAD  Neurology: A&Ox3, nonfocal  Head:  Normocephalic, atraumatic  ENT:  Mucosa moist, no ulcerations  Neck:  Supple, no JVD,   Skin: no breakdowns (as per RN)  Resp: CTA B/L  CV: RRR, S1S2,   GI: Soft, NT, bowel sounds  MS: B/L foot dressings      A/P:  54y old  man with b/l foot ulcers w/ septic arthritis in fifth MTP on the left foot and chronic osteomyelitis of right fifth MTP on right foot.    IV abx as per ID via PICC- unasyn    wound care as per podiatry    OOB to chair    glycemic control    weigh bearing status TBD as per podiatry    possible DC on Friday    DVT prophylaxis  Decubitus prevention- all measures as per RN protocol  Please call or text me with any questions or updates

## 2020-07-01 NOTE — PROGRESS NOTE ADULT - SUBJECTIVE AND OBJECTIVE BOX
DAILY PROGRESS NOTE  ===========================================================    Patient Information:  RAD PARKS  /  54y  /  Male  /  MRN#: [id number]    Hospital Day: 6d     |:::::::::::::::::::::::::::| SUBJECTIVE |:::::::::::::::::::::::::::|    OVERNIGHT EVENTS:  no overnight event  TODAY: Patient was seen today at bedside. Review of systems is otherwise negative. Patient feeling well today. no complaints to offer, no pain from the operation site on Monday. Podiatry to redress the wound today.     ROS: denies chest pain, shortness of breath, nausea, vomiting, constipation, diarrhea, joint pain, difficulty voiding    |:::::::::::::::::::::::::::| OBJECTIVE |:::::::::::::::::::::::::::|    VITAL SIGNS: Last 24 Hours  T(C): 36.7 (01 Jul 2020 05:09), Max: 36.7 (01 Jul 2020 05:09)  T(F): 98.1 (01 Jul 2020 05:09), Max: 98.1 (01 Jul 2020 05:09)  HR: 69 (01 Jul 2020 05:09) (69 - 82)  BP: 100/68 (01 Jul 2020 05:09) (96/61 - 100/68)  BP(mean): --  RR: 18 (01 Jul 2020 05:09) (18 - 18)  SpO2: 92% (01 Jul 2020 07:30) (92% - 97%)    PHYSICAL EXAM:  GENERAL:   Awake, alert; NAD.  HEENT:  Head NC/AT; Conjunctivae pink, Sclera anicteric; Oral mucosa moist.  CARDIO:   Regular rate; Regular rhythm; S1 & S2.  RESP:   No rales, wheezing, or rhonchi appreciated.  GI:   Soft; NT/ND; BS; No guarding; No rebound tenderness.  EXT:   Right foot with wound wrapped in ace bandage, Left foot wrapping in surgical dressing as well, patient advised podiatry to change dressings today.   SKIN:   Intact.    LAB RESULTS:                        12.9   5.30  )-----------( 250      ( 01 Jul 2020 05:46 )             39.6     07-01    138  |  101  |  16  ----------------------------<  138<H>  5.1<H>   |  27  |  1.4    Ca    9.3      01 Jul 2020 05:46  Mg     2.1     07-01    TPro  6.7  /  Alb  4.1  /  TBili  0.2  /  DBili  x   /  AST  26  /  ALT  56<H>  /  AlkPhos  85  07-01                MICROBIOLOGY:    Culture - Tissue with Gram Stain (collected 29 Jun 2020 13:40)  Source: .Tissue Distal Marain Bone  Gram Stain (30 Jun 2020 04:41):    No polymorphonuclear leukocytes seen per low power field    No organisms seen  Preliminary Report (30 Jun 2020 20:30):    Few Staphylococcus aureus    Culture - Tissue with Gram Stain (collected 29 Jun 2020 13:40)  Source: .Tissue None  Gram Stain (30 Jun 2020 04:46):    Rare polymorphonuclear leukocytes seen per low power field    No organisms seen  Preliminary Report (30 Jun 2020 20:35):    Few Staphylococcus aureus    Culture - Tissue with Gram Stain (collected 29 Jun 2020 13:40)  Source: .Tissue None  Gram Stain (30 Jun 2020 01:24):    No polymorphonuclear leukocytes seen per low power field    No organisms seen  Preliminary Report (30 Jun 2020 21:30):    Rare Staphylococcus aureus    See previous culture [id number]    Culture - Surgical Swab (collected 29 Jun 2020 13:40)  Source: .Surgical Swab None  Preliminary Report (30 Jun 2020 20:58):   Numerous Staphylococcus aureus    See previous culture [id number]      RADIOLOGY:    ALLERGIES: No Known Allergies      ===========================================================

## 2020-07-01 NOTE — PROGRESS NOTE ADULT - ASSESSMENT
· Assessment		  ASSESSMENT  Patient is a 53 yo male w/ PMHx of Peripheral Neuropathy, HTN, right nephrectomy, sciatica presented to the ED w/ chronic ulcers in b/l feet.      IMPRESSION  # Osteomyelitis of Left 5th Metatarsal Bone with surrounding cellulitis and septic arthritis secondary to Chronic Nonhealing Ulcer on Plantar Surface of Left foot    PROCEDURES: 6/29  Open biopsy, bone  Irrigation and excisional debridement of tissue including subcutaneous tissue  Open resection of left metatarsal 5th met head and base of the proximal phalanx left foot    # Chronic Osteomyelitis of Right 5th Metatarsal Bone secondary to Chronic Nonhealing Ulcer on Plantar surface of Right foot    - No recent changes    RECOMMENDATIONS  f/u tissue cultures 6/30  Unasyn 3 gm iv q6h  Picc line  Probably on discharge Ancef 2 gm iv q8h

## 2020-07-01 NOTE — PROGRESS NOTE ADULT - SUBJECTIVE AND OBJECTIVE BOX
RAD PARKS  54y, Male    All available historical data reviewed    OVERNIGHT EVENTS:  no fevers  feels well and has no complaints   PROCEDURES:  Open biopsy, bone, foot 29-Jun-2020 14:53:40  Sunny Tineo  Irrigation and excisional debridement of tissue including subcutaneous tissue 29-Jun-2020 14:53:23  Sunny Tineo  Open resection of left metatarsal 29-Jun-2020 14:45:29 5th met head and base of the proximal phalanx left foot     ROS:  General: Denies rigors, night sweats  HEENT: Denies headache, rhinorrhea, sore throat, eye pain  CV: Denies CP, palpitations  PULM: Denies wheezing, hemoptysis  GI: Denies hematemesis, hematochezia, melena  : Denies discharge, hematuria  MSK: Denies arthralgias, myalgias  SKIN: Denies rash, lesions  NEURO: Denies paresthesias, weakness  PSYCH: Denies depression, anxiety    VITALS:  T(F): 97.1, Max: 98.1 (07-01-20 @ 05:09)  HR: 76  BP: 115/65  RR: 20Vital Signs Last 24 Hrs  T(C): 36.2 (01 Jul 2020 14:03), Max: 36.7 (01 Jul 2020 05:09)  T(F): 97.1 (01 Jul 2020 14:03), Max: 98.1 (01 Jul 2020 05:09)  HR: 76 (01 Jul 2020 14:03) (69 - 82)  BP: 115/65 (01 Jul 2020 14:03) (96/61 - 115/65)  BP(mean): --  RR: 20 (01 Jul 2020 14:03) (18 - 20)  SpO2: 92% (01 Jul 2020 07:30) (92% - 97%)    TESTS & MEASUREMENTS:                        12.9   5.30  )-----------( 250      ( 01 Jul 2020 05:46 )             39.6     07-01    138  |  101  |  16  ----------------------------<  138<H>  5.1<H>   |  27  |  1.4    Ca    9.3      01 Jul 2020 05:46  Mg     2.1     07-01    TPro  6.7  /  Alb  4.1  /  TBili  0.2  /  DBili  x   /  AST  26  /  ALT  56<H>  /  AlkPhos  85  07-01    LIVER FUNCTIONS - ( 01 Jul 2020 05:46 )  Alb: 4.1 g/dL / Pro: 6.7 g/dL / ALK PHOS: 85 U/L / ALT: 56 U/L / AST: 26 U/L / GGT: x             Culture - Tissue with Gram Stain (collected 06-29-20 @ 13:40)  Source: .Tissue Distal Marain Bone  Gram Stain (06-30-20 @ 04:41):    No polymorphonuclear leukocytes seen per low power field    No organisms seen  Preliminary Report (06-30-20 @ 20:30):    Few Staphylococcus aureus    Culture - Tissue with Gram Stain (collected 06-29-20 @ 13:40)  Source: .Tissue None  Gram Stain (06-30-20 @ 04:46):    Rare polymorphonuclear leukocytes seen per low power field    No organisms seen  Preliminary Report (06-30-20 @ 20:35):    Few Staphylococcus aureus    Culture - Tissue with Gram Stain (collected 06-29-20 @ 13:40)  Source: .Tissue None  Gram Stain (06-30-20 @ 01:24):    No polymorphonuclear leukocytes seen per low power field    No organisms seen  Preliminary Report (06-30-20 @ 21:30):    Rare Staphylococcus aureus    See previous culture [id number]    Culture - Surgical Swab (collected 06-29-20 @ 13:40)  Source: .Surgical Swab None  Preliminary Report (06-30-20 @ 20:58):    Numerous Staphylococcus aureus    See previous culture [id number]    Culture - Abscess with Gram Stain (collected 06-25-20 @ 13:19)  Source: .Abscess Left 5th metatarsal  Final Report (06-30-20 @ 17:58):    Moderate Staphylococcus aureus    Few Enterococcus faecalis  Organism: Staphylococcus aureus  Enterococcus faecalis (06-30-20 @ 17:58)  Organism: Enterococcus faecalis (06-30-20 @ 17:58)      -  Ampicillin: S <=2 Predicts results to ampicillin/sulbactam, amoxacillin-clavulanate and  piperacillin-tazobactam.      -  Tetra/Doxy: S <=1      -  Vancomycin: S 2      Method Type: CINDY  Organism: Staphylococcus aureus (06-30-20 @ 17:58)      -  Ampicillin/Sulbactam: S <=8/4      -  Cefazolin: S <=4      -  Clindamycin: S <=0.25      -  Erythromycin: S <=0.25      -  Gentamicin: S <=1 Should not be used as monotherapy      -  Oxacillin: S 0.5      -  Penicillin: R 4      -  RIF- Rifampin: S <=1 Should not be used as monotherapy      -  Tetra/Doxy: S <=1      -  Trimethoprim/Sulfamethoxazole: S <=0.5/9.5      -  Vancomycin: S 1      Method Type: CINDY    Culture - Blood (collected 06-25-20 @ 10:50)  Source: .Blood Blood  Final Report (06-30-20 @ 17:00):    No Growth Final    Culture - Blood (collected 06-25-20 @ 10:50)  Source: .Blood Blood  Final Report (06-30-20 @ 17:00):    No Growth Final            RADIOLOGY & ADDITIONAL TESTS:  Personal review of radiological diagnostics performed  Echo and EKG results noted when applicable.     MEDICATIONS:  amLODIPine   Tablet 5 milliGRAM(s) Oral daily  ampicillin/sulbactam  IVPB 3 Gram(s) IV Intermittent every 6 hours  chlorhexidine 4% Liquid 1 Application(s) Topical <User Schedule>  enoxaparin Injectable 40 milliGRAM(s) SubCutaneous daily  hydrocortisone 1% Cream 1 Application(s) Topical two times a day  lisinopril 10 milliGRAM(s) Oral daily  pregabalin 150 milliGRAM(s) Oral four times a day      ANTIBIOTICS:  ampicillin/sulbactam  IVPB 3 Gram(s) IV Intermittent every 6 hours

## 2020-07-02 ENCOUNTER — TRANSCRIPTION ENCOUNTER (OUTPATIENT)
Age: 55
End: 2020-07-02

## 2020-07-02 LAB
ALBUMIN SERPL ELPH-MCNC: 4.1 G/DL — SIGNIFICANT CHANGE UP (ref 3.5–5.2)
ALP SERPL-CCNC: 81 U/L — SIGNIFICANT CHANGE UP (ref 30–115)
ALT FLD-CCNC: 52 U/L — HIGH (ref 0–41)
ANION GAP SERPL CALC-SCNC: 11 MMOL/L — SIGNIFICANT CHANGE UP (ref 7–14)
AST SERPL-CCNC: 28 U/L — SIGNIFICANT CHANGE UP (ref 0–41)
BASOPHILS # BLD AUTO: 0.02 K/UL — SIGNIFICANT CHANGE UP (ref 0–0.2)
BASOPHILS NFR BLD AUTO: 0.4 % — SIGNIFICANT CHANGE UP (ref 0–1)
BILIRUB SERPL-MCNC: 0.5 MG/DL — SIGNIFICANT CHANGE UP (ref 0.2–1.2)
BUN SERPL-MCNC: 16 MG/DL — SIGNIFICANT CHANGE UP (ref 10–20)
CALCIUM SERPL-MCNC: 9.1 MG/DL — SIGNIFICANT CHANGE UP (ref 8.5–10.1)
CHLORIDE SERPL-SCNC: 104 MMOL/L — SIGNIFICANT CHANGE UP (ref 98–110)
CO2 SERPL-SCNC: 26 MMOL/L — SIGNIFICANT CHANGE UP (ref 17–32)
CREAT SERPL-MCNC: 1.3 MG/DL — SIGNIFICANT CHANGE UP (ref 0.7–1.5)
EOSINOPHIL # BLD AUTO: 0.2 K/UL — SIGNIFICANT CHANGE UP (ref 0–0.7)
EOSINOPHIL NFR BLD AUTO: 4.2 % — SIGNIFICANT CHANGE UP (ref 0–8)
GLUCOSE BLDC GLUCOMTR-MCNC: 154 MG/DL — HIGH (ref 70–99)
GLUCOSE BLDC GLUCOMTR-MCNC: 154 MG/DL — HIGH (ref 70–99)
GLUCOSE SERPL-MCNC: 126 MG/DL — HIGH (ref 70–99)
HCT VFR BLD CALC: 38 % — LOW (ref 42–52)
HGB BLD-MCNC: 12.7 G/DL — LOW (ref 14–18)
IMM GRANULOCYTES NFR BLD AUTO: 1.1 % — HIGH (ref 0.1–0.3)
LYMPHOCYTES # BLD AUTO: 1.54 K/UL — SIGNIFICANT CHANGE UP (ref 1.2–3.4)
LYMPHOCYTES # BLD AUTO: 32.4 % — SIGNIFICANT CHANGE UP (ref 20.5–51.1)
MAGNESIUM SERPL-MCNC: 2.1 MG/DL — SIGNIFICANT CHANGE UP (ref 1.8–2.4)
MCHC RBC-ENTMCNC: 28.9 PG — SIGNIFICANT CHANGE UP (ref 27–31)
MCHC RBC-ENTMCNC: 33.4 G/DL — SIGNIFICANT CHANGE UP (ref 32–37)
MCV RBC AUTO: 86.6 FL — SIGNIFICANT CHANGE UP (ref 80–94)
MONOCYTES # BLD AUTO: 0.47 K/UL — SIGNIFICANT CHANGE UP (ref 0.1–0.6)
MONOCYTES NFR BLD AUTO: 9.9 % — HIGH (ref 1.7–9.3)
NEUTROPHILS # BLD AUTO: 2.48 K/UL — SIGNIFICANT CHANGE UP (ref 1.4–6.5)
NEUTROPHILS NFR BLD AUTO: 52 % — SIGNIFICANT CHANGE UP (ref 42.2–75.2)
NRBC # BLD: 0 /100 WBCS — SIGNIFICANT CHANGE UP (ref 0–0)
PHOSPHATE SERPL-MCNC: 3.4 MG/DL — SIGNIFICANT CHANGE UP (ref 2.1–4.9)
PLATELET # BLD AUTO: 249 K/UL — SIGNIFICANT CHANGE UP (ref 130–400)
POTASSIUM SERPL-MCNC: 5 MMOL/L — SIGNIFICANT CHANGE UP (ref 3.5–5)
POTASSIUM SERPL-SCNC: 5 MMOL/L — SIGNIFICANT CHANGE UP (ref 3.5–5)
PROT SERPL-MCNC: 6.7 G/DL — SIGNIFICANT CHANGE UP (ref 6–8)
RBC # BLD: 4.39 M/UL — LOW (ref 4.7–6.1)
RBC # FLD: 13.5 % — SIGNIFICANT CHANGE UP (ref 11.5–14.5)
SODIUM SERPL-SCNC: 141 MMOL/L — SIGNIFICANT CHANGE UP (ref 135–146)
WBC # BLD: 4.76 K/UL — LOW (ref 4.8–10.8)
WBC # FLD AUTO: 4.76 K/UL — LOW (ref 4.8–10.8)

## 2020-07-02 PROCEDURE — 36573 INSJ PICC RS&I 5 YR+: CPT

## 2020-07-02 RX ORDER — CEFAZOLIN SODIUM 1 G
2000 VIAL (EA) INJECTION EVERY 8 HOURS
Refills: 0 | Status: DISCONTINUED | OUTPATIENT
Start: 2020-07-02 | End: 2020-07-03

## 2020-07-02 RX ORDER — CEFAZOLIN SODIUM 1 G
2 VIAL (EA) INJECTION
Qty: 288 | Refills: 0
Start: 2020-07-02 | End: 2020-08-18

## 2020-07-02 RX ORDER — HYDROCORTISONE 1 %
1 OINTMENT (GRAM) TOPICAL
Qty: 0 | Refills: 0 | DISCHARGE
Start: 2020-07-02

## 2020-07-02 RX ORDER — AMPICILLIN SODIUM AND SULBACTAM SODIUM 250; 125 MG/ML; MG/ML
3 INJECTION, POWDER, FOR SUSPENSION INTRAMUSCULAR; INTRAVENOUS
Qty: 0 | Refills: 0 | DISCHARGE
Start: 2020-07-02

## 2020-07-02 RX ADMIN — AMPICILLIN SODIUM AND SULBACTAM SODIUM 200 GRAM(S): 250; 125 INJECTION, POWDER, FOR SUSPENSION INTRAMUSCULAR; INTRAVENOUS at 05:31

## 2020-07-02 RX ADMIN — ENOXAPARIN SODIUM 40 MILLIGRAM(S): 100 INJECTION SUBCUTANEOUS at 11:19

## 2020-07-02 RX ADMIN — Medication 150 MILLIGRAM(S): at 05:31

## 2020-07-02 RX ADMIN — Medication 150 MILLIGRAM(S): at 11:19

## 2020-07-02 RX ADMIN — Medication 1 APPLICATION(S): at 17:37

## 2020-07-02 RX ADMIN — Medication 150 MILLIGRAM(S): at 18:20

## 2020-07-02 RX ADMIN — AMPICILLIN SODIUM AND SULBACTAM SODIUM 200 GRAM(S): 250; 125 INJECTION, POWDER, FOR SUSPENSION INTRAMUSCULAR; INTRAVENOUS at 11:19

## 2020-07-02 RX ADMIN — AMLODIPINE BESYLATE 5 MILLIGRAM(S): 2.5 TABLET ORAL at 05:31

## 2020-07-02 RX ADMIN — LISINOPRIL 10 MILLIGRAM(S): 2.5 TABLET ORAL at 05:31

## 2020-07-02 RX ADMIN — Medication 100 MILLIGRAM(S): at 23:11

## 2020-07-02 RX ADMIN — Medication 150 MILLIGRAM(S): at 23:10

## 2020-07-02 NOTE — PROCEDURE NOTE - NSPOSTCAREGUIDE_GEN_A_CORE
Keep the cast/splint/dressing clean and dry/Care for catheter as per unit/ICU protocols/Verbal/written post procedure instructions were given to patient/caregiver/Instructed patient/caregiver to follow-up with primary care physician/Instructed patient/caregiver regarding signs and symptoms of infection

## 2020-07-02 NOTE — DIETITIAN INITIAL EVALUATION ADULT. - ENERGY NEEDS
Calories: 8698-8441 kcal/day (MSJ x 1-1.1 AF for obese BMI considered)  Protein: 81-97 g/day (1-1.2 g/kg IBW for above reason)  Fluids: 1 ml/kcal or per LIP

## 2020-07-02 NOTE — DISCHARGE NOTE PROVIDER - HOSPITAL COURSE
Reason for Admission: Pt admitted for b/l foot ulcers w/ septic arthritis in fifth MTP on the left foot and chronic osteomyelitis of right fifth MTP on right foot.        Pt is a 55 yo male w/ PMHx of HTN, right nephrectomy, sciatica presenting to the ED w/ chronic ulcers in b/l feet. Patient was seen earlier by podiatrist, Dr. Saldaña, who noticed the b/l foot ulcers getting worse, ordered MRI on 06/15 which showed Left 5th MTP bone osteomyelitis suggesting of septic arthritis and Right 5th MTP chronic osteomyelitis. Pt reports Left foot ulcer has started last year and has progressively getting worse over the last month with stabbing 08/10 pain, increase in pink drainage, and redness of the skin. Pt's chronic Right foot chronic ulcer was first noticed 3 years ago and pt has been previously hospitalized w/ abx for osteomyelitis. Pt denies any fevers, chills, CP, palpitations, SOB, n/v/d, burning on urination, hematuria, or weight changes.          The patient had a partial resection of the 5th digit of the left foot on 6/30/2020 with podiatry. He received Unasyn inpatient and was transitioned to [facility] for outpatient treatment which he will continue for 6 weeks. Podiatry requesting wound vac on discharge for wound care.

## 2020-07-02 NOTE — DISCHARGE NOTE PROVIDER - NSDCMRMEDTOKEN_GEN_ALL_CORE_FT
acetaminophen 325 mg oral tablet: 2 tab(s) orally every 6 hours  amlodipine-benazepril 5 mg-10 mg oral capsule: 1 cap(s) orally once a day  ampicillin-sulbactam: 3 gram(s) intravenous every 6 hours  CoQ10 300 mg oral capsule: 1 cap(s) orally once a day  hydrocortisone 1% topical cream: 1 application topically 2 times a day  pregabalin 150 mg oral capsule: 1 cap(s) orally 4 times a day acetaminophen 325 mg oral tablet: 2 tab(s) orally every 6 hours  amlodipine-benazepril 5 mg-10 mg oral capsule: 1 cap(s) orally once a day  ceFAZolin 2 g intravenous injection: 2 gram(s) intravenously every 8 hours for 6 weeks ending 8/13/2020  CoQ10 300 mg oral capsule: 1 cap(s) orally once a day  hydrocortisone 1% topical cream: 1 application topically 2 times a day  pregabalin 150 mg oral capsule: 1 cap(s) orally 4 times a day

## 2020-07-02 NOTE — PROGRESS NOTE ADULT - SUBJECTIVE AND OBJECTIVE BOX
Patient was seen and examined. Spoke with RN. Chart reviewed.  No events overnight.  Vital Signs Last 24 Hrs  T(F): 98.1 (02 Jul 2020 05:00), Max: 98.1 (02 Jul 2020 05:00)  HR: 72 (02 Jul 2020 05:00) (72 - 76)  BP: 116/73 (02 Jul 2020 05:00) (114/58 - 116/73)  SpO2: 95% (01 Jul 2020 20:18) (95% - 95%)  MEDICATIONS  (STANDING):  amLODIPine   Tablet 5 milliGRAM(s) Oral daily  ampicillin/sulbactam  IVPB 3 Gram(s) IV Intermittent every 6 hours  chlorhexidine 4% Liquid 1 Application(s) Topical <User Schedule>  enoxaparin Injectable 40 milliGRAM(s) SubCutaneous daily  hydrocortisone 1% Cream 1 Application(s) Topical two times a day  lisinopril 10 milliGRAM(s) Oral daily  pregabalin 150 milliGRAM(s) Oral four times a day    MEDICATIONS  (PRN):    Labs:                        12.9   5.30  )-----------( 250      ( 01 Jul 2020 05:46 )             39.6     01 Jul 2020 05:46    138    |  101    |  16     ----------------------------<  138    5.1     |  27     |  1.4      Ca    9.3        01 Jul 2020 05:46  Mg     2.1       01 Jul 2020 05:46    TPro  6.7    /  Alb  4.1    /  TBili  0.2    /  DBili  x      /  AST  26     /  ALT  56     /  AlkPhos  85     01 Jul 2020 05:46          Culture - Tissue with Gram Stain (collected 29 Jun 2020 13:40)  Source: .Tissue Distal Marain Bone  Gram Stain (30 Jun 2020 04:41):    No polymorphonuclear leukocytes seen per low power field    No organisms seen  Preliminary Report (30 Jun 2020 20:30):    Few Staphylococcus aureus  Organism: Staphylococcus aureus (01 Jul 2020 21:52)  Organism: Staphylococcus aureus (01 Jul 2020 21:52)    Culture - Tissue with Gram Stain (collected 29 Jun 2020 13:40)  Source: .Tissue None  Gram Stain (30 Jun 2020 04:46):    Rare polymorphonuclear leukocytes seen per low power field    No organisms seen  Preliminary Report (30 Jun 2020 20:35):    Few Staphylococcus aureus  Organism: Staphylococcus aureus (01 Jul 2020 21:49)  Organism: Staphylococcus aureus (01 Jul 2020 21:49)    Culture - Tissue with Gram Stain (collected 29 Jun 2020 13:40)  Source: .Tissue None  Gram Stain (30 Jun 2020 01:24):    No polymorphonuclear leukocytes seen per low power field    No organisms seen  Preliminary Report (30 Jun 2020 21:30):    Rare Staphylococcus aureus    See previous culture [id number]    Culture - Surgical Swab (collected 29 Jun 2020 13:40)  Source: .Surgical Swab None  Preliminary Report (30 Jun 2020 20:58):    Numerous Staphylococcus aureus    See previous culture [id number]      General: comfortable, NAD  Neurology: A&Ox3, nonfocal  Head:  Normocephalic, atraumatic  ENT:  Mucosa moist, no ulcerations  Neck:  Supple, no JVD,   Resp: CTA B/L  CV: RRR, S1S2,   GI: Soft, NT, bowel sounds  MS: B/L feet bandaged      A/P:  54y old  man with b/l foot ulcers w/ septic arthritis in fifth MTP on the left foot and chronic osteomyelitis of right fifth MTP on right foot.    IV abx as per ID via PICC- unasyn for now; but ID f/u today to decie on home abx- possibly ancef    wound care as per podiatry    wound vac being ordered    OOB to chair    glycemic control    weigh bearing status TBD as per podiatry    possible DC on Friday if abx and wound vac are arranged    DVT prophylaxis  Decubitus prevention- all measures as per RN protocol  Please call or text me with any questions or updates

## 2020-07-02 NOTE — PROCEDURE NOTE - NSPROCDETAILS_GEN_ALL_CORE
sterile dressing applied/sterile technique, catheter placed/ultrasound guidance/location identified, draped/prepped, sterile technique used/supine position

## 2020-07-02 NOTE — DISCHARGE NOTE PROVIDER - CARE PROVIDER_API CALL
Iván Schneider  INTERNAL MEDICINE  [address] [place] [address]  Phone: [phone]  Fax: [phone]  Follow Up Time:

## 2020-07-02 NOTE — DIETITIAN INITIAL EVALUATION ADULT. - ADD RECOMMEND
Consider changing current diet order to DASH/TLC, consistent carb. Consider changing current diet order to DASH/TLC, consistent carb when medically able.

## 2020-07-02 NOTE — DIETITIAN INITIAL EVALUATION ADULT. - OTHER INFO
Pertinent Medical Information: Septic arthritis of L 5th toe; s/p left partial 5th met head and base of resection on 6/29, ID and podiatry following; pt is HD stable; PICC line scheduled today for 6 weeks of IV abx. PVD vascular sx consult. S/p R nephrectomy; renal function stable.     Pertinent Subjective Information: Pt reports he had good appetite PTA consuming 3 meals+ snacks. UBW of ~250 lbs (few years ago); does not remember recent wt but  denies any changes in wt recently. Pt took MVI, vitamin B12, CQ10, and probiotics supplements PTA. NKFA. No cultural/Sabianism food preferences noted. Pt followed DASH/TLC and low carb diet PTA. Pt currently has good appetite consuming >75% of meals. RD discussed with Resident Matra at [phone] about pt's HgbA1c value being elevated; resident to discuss it with attending since pt does not have any PMHX of diabetes.

## 2020-07-02 NOTE — PROCEDURE NOTE - NSPOSTPRCRAD_GEN_A_CORE
chest radiograph/depth of insertion/line adjusted to depth of insertion/fluoroscopy/line in appropriate postion

## 2020-07-02 NOTE — DISCHARGE NOTE PROVIDER - NSDCCPCAREPLAN_GEN_ALL_CORE_FT
PRINCIPAL DISCHARGE DIAGNOSIS  Diagnosis: Osteomyelitis of foot, unspecified laterality, unspecified type  Assessment and Plan of Treatment: PRINCIPAL DISCHARGE DIAGNOSIS  Diagnosis: Osteomyelitis of foot, unspecified laterality, unspecified type  Assessment and Plan of Treatment: #Septic arthritis of L 5th toe:   - ID on board treated with Unasyn 3 IV q6h post-op, to transition to Ancef 2 gm iv q8h for 6 weeks for discharge  - PICC inserted on 7/2/2020 for long term antibiotic administration   - Wound vac placement at discharge per podiatry   -XR left foot completed   -Patient is hemodynamically stable, please return to the ED with worsening or reoccurance of symptoms

## 2020-07-02 NOTE — PROGRESS NOTE ADULT - SUBJECTIVE AND OBJECTIVE BOX
DAILY PROGRESS NOTE  ===========================================================    Patient Information:  RAD PARKS  /  54y  /  Male  /  MRN#: [id number]    Hospital Day: 7d     |:::::::::::::::::::::::::::| SUBJECTIVE |:::::::::::::::::::::::::::|    OVERNIGHT EVENTS: no overnight events.  TODAY: Patient was seen today at bedside. Review of systems is otherwise negative. Patient is eating and drinking well. Voiding and having normal bowel movements. No complaints to offer. Not complaining of pain    |:::::::::::::::::::::::::::| OBJECTIVE |:::::::::::::::::::::::::::|    VITAL SIGNS: Last 24 Hours  T(C): 36.7 (02 Jul 2020 05:00), Max: 36.7 (02 Jul 2020 05:00)  T(F): 98.1 (02 Jul 2020 05:00), Max: 98.1 (02 Jul 2020 05:00)  HR: 72 (02 Jul 2020 05:00) (72 - 76)  BP: 116/73 (02 Jul 2020 05:00) (114/58 - 116/73)  BP(mean): --  RR: 18 (02 Jul 2020 05:00) (18 - 20)  SpO2: 95% (01 Jul 2020 20:18) (95% - 95%)    PHYSICAL EXAM:  GENERAL:   Awake, alert; NAD.  HEENT:  Head NC/AT; Conjunctivae pink, Sclera anicteric; Oral mucosa moist.  CARDIO: Regular rate; Regular rhythm; S1 & S2.  RESP:   No rales, wheezing, or rhonchi appreciated.  GI:   Soft; NT/ND; BS; No guarding; No rebound tenderness.  EXT:  No edema, right and left foot wrapped in ace bandages, wounds being addressed with podiatry    SKIN:   Intact.    LAB RESULTS:                        12.7   4.76  )-----------( 249      ( 02 Jul 2020 06:39 )             38.0     07-02    141  |  104  |  16  ----------------------------<  126<H>  5.0   |  26  |  1.3    Ca    9.1      02 Jul 2020 06:39  Phos  3.4     07-02  Mg     2.1     07-02    TPro  6.7  /  Alb  4.1  /  TBili  0.5  /  DBili  x   /  AST  28  /  ALT  52<H>  /  AlkPhos  81  07-02                MICROBIOLOGY:    Culture - Tissue with Gram Stain (collected 29 Jun 2020 13:40)  Source: .Tissue Distal Marain Bone  Gram Stain (30 Jun 2020 04:41):    No polymorphonuclear leukocytes seen per low power field    No organisms seen  Preliminary Report (30 Jun 2020 20:30):    Few Staphylococcus aureus  Organism: Staphylococcus aureus (01 Jul 2020 21:52)  Organism: Staphylococcus aureus (01 Jul 2020 21:52)    Culture - Tissue with Gram Stain (collected 29 Jun 2020 13:40)  Source: .Tissue None  Gram Stain (30 Jun 2020 04:46):    Rare polymorphonuclear leukocytes seen per low power field    No organisms seen  Preliminary Report (30 Jun 2020 20:35):    Few Staphylococcus aureus  Organism: Staphylococcus aureus (01 Jul 2020 21:49)  Organism: Staphylococcus aureus (01 Jul 2020 21:49)    Culture - Tissue with Gram Stain (collected 29 Jun 2020 13:40)  Source: .Tissue None  Gram Stain (30 Jun 2020 01:24):    No polymorphonuclear leukocytes seen per low power field    No organisms seen  Preliminary Report (30 Jun 2020 21:30):    Rare Staphylococcus aureus    See previous culture [id number]    Culture - Surgical Swab (collected 29 Jun 2020 13:40)  Source: .Surgical Swab None  Preliminary Report (30 Jun 2020 20:58):    Numerous Staphylococcus aureus    See previous culture [id number]      RADIOLOGY:  < from: Xray Foot AP + Lateral + Oblique, Left (07.01.20 @ 15:25) >  impression:    There is decreased soft tissue air at the surgical bed. Again seen is patient post resection of the fifth proximal phalangeal base and distal portion of the fifth metatarsal with unchanged mild periosteal reaction at the fifth proximal phalanx. Recommend clinical correlation and attention on follow-up radiographs to demonstrate stability.    Other findings are stable.    < end of copied text >      ALLERGIES: No Known Allergies      ===========================================================

## 2020-07-02 NOTE — DIETITIAN INITIAL EVALUATION ADULT. - RD TO REMAIN AVAILABLE
Interventions: meals and snacks. Monitor/Evaluate: RD to monitor energy intake, body comp, NFPF, glucose profile./yes

## 2020-07-02 NOTE — PROGRESS NOTE ADULT - ASSESSMENT
53 y/o M with hx of PVD, HTN, R nephrectomy, sciatica presenting with chronic foot ulcers, osteomyelitis, and septic arthritis.     #Septic arthritis of L 5th toe:   - ID on board, appreciate recs after culture  - Podiatry following, requests to keep pt till Friday  - HD stable  - Continue Unasyn 3 IV q6h post-op,   - PICC line scheduled for 7/2/2020, will need outpatient Abx for 6 weeks most likely Ancef 2 gm iv q8h   - Wound vac placement at discharge per podiatry   - Derm c/l placed for rash on R foot, recommending that podiatry can biopsy inflammatory plaque if it does not resolve with treatment of the ulcer.  -XR left foot completed     # PVD:   - Duplex on 6/25 showed stenosis in the arteries of lower extremity and mild to mod artherosclerotic disease occlusive diseases noted in the L popliteal and posterior tibial arteries  - Vascular surgery consulted f/u after surgery     # s/p R nephrectomy  - renal function stable  - BUN 16 (6/30) Cr 1.3 (6/30) eGFR 61 (6/30)     #HTN:  - BP stable  - Continue home BP meds, amlodipine 5mg  - Takes benzapril 10mg at home will give Lisinopril 10mg    #Sciatica  - Pregabalin 150mg    DVT ppx: Enoxaparin 40mg  Diet: DASH, Sodium and cholesterol restricted  Activity: Weight bearing as tolerated  Code: Full code 53 y/o M with hx of PVD, HTN, R nephrectomy, sciatica presenting with chronic foot ulcers, osteomyelitis, and septic arthritis.     #Septic arthritis of L 5th toe:   - ID on board, appreciate recs after culture  - Podiatry following, requests to keep pt till Friday  - HD stable  - Continue Unasyn 3 IV q6h post-op,   - PICC line scheduled for 7/2/2020, will need outpatient Abx for 6 weeks most likely Ancef 2 gm iv q8h   - Wound vac placement at discharge per podiatry   - Derm c/l placed for rash on R foot, recommending that podiatry can biopsy inflammatory plaque if it does not resolve with treatment of the ulcer.  -XR left foot completed   -Transitioned to Ancef for DC to home or SNIF depending dispo     # PVD:   - Duplex on 6/25 showed stenosis in the arteries of lower extremity and mild to mod artherosclerotic disease occlusive diseases noted in the L popliteal and posterior tibial arteries  - Vascular surgery consulted f/u after surgery     # s/p R nephrectomy  - renal function stable  - BUN 16 (6/30) Cr 1.3 (6/30) eGFR 61 (6/30)     #HTN:  - BP stable  - Continue home BP meds, amlodipine 5mg  - Takes benzapril 10mg at home will give Lisinopril 10mg    #Sciatica  - Pregabalin 150mg    DVT ppx: Enoxaparin 40mg  Diet: DASH, Sodium and cholesterol restricted  Activity: Weight bearing as tolerated  Code: Full code

## 2020-07-02 NOTE — DIETITIAN INITIAL EVALUATION ADULT. - PHYSICAL APPEARANCE
BMI 37.9; AAOx4; no edema noted; no chewing/swallowing difficulties noted; no GI issues noted, LBM 7/2 (per pt); skin-sx incision and wound R foot./obese

## 2020-07-02 NOTE — PROGRESS NOTE ADULT - SUBJECTIVE AND OBJECTIVE BOX
Podiatry Progress Note    Subjective:   RAD PARKS is a pleasant well-nourished, well developed 54y Male in no acute distress, alert awake, and oriented to person, place and time.  Patient is a 54y old  Male who presents with a chief complaint of Pt admitted for b/l foot ulcers w/ septic arthritis in fifth MTP on the left foot and chronic osteomyelitis of right fifth MTP on right foot. (02 Jul 2020 08:23)      PAST MEDICAL & SURGICAL HISTORY:  Back pain with sciatica  Numbness: legs  Umbilical hernia  Mild HTN  Sleep apnea: possible un diagnosed  PVD (peripheral vascular disease)  Peripheral neuropathy  Obesity  History of right nephrectomy: 02/2020       Objective:  Vital Signs Last 24 Hrs  T(C): 36.7 (02 Jul 2020 05:00), Max: 36.7 (02 Jul 2020 05:00)  T(F): 98.1 (02 Jul 2020 05:00), Max: 98.1 (02 Jul 2020 05:00)  HR: 72 (02 Jul 2020 05:00) (72 - 76)  BP: 116/73 (02 Jul 2020 05:00) (114/58 - 116/73)  BP(mean): --  RR: 18 (02 Jul 2020 05:00) (18 - 20)  SpO2: 95% (01 Jul 2020 20:18) (95% - 95%)                        12.7   4.76  )-----------( 249      ( 02 Jul 2020 06:39 )             38.0                 07-02    141  |  104  |  16  ----------------------------<  126<H>  5.0   |  26  |  1.3    Ca    9.1      02 Jul 2020 06:39  Phos  3.4     07-02  Mg     2.1     07-02    TPro  6.7  /  Alb  4.1  /  TBili  0.5  /  DBili  x   /  AST  28  /  ALT  52<H>  /  AlkPhos  81  07-02    Physical Exam - Lower Extremity Focused:   Derm:   #Left:   Open wound; plantar 5th metatarsal head  Sutures intact on the distal and proximal aspect of wound  Stable; no sign of wound dehiscence     #Right  Lateral Aspect of Midfoot; Mild erythema; No Open Wound   Plantar Aspect; Open Ulcer of the 4th metatarsal w/ fibrotic periwound, Probes Deep to Bone;     Vasc: DP & PT diminished on right foot.    Assessment:  S/p left partial 5th met head and base of resection; 6/29  Open Ulcer on the 4th Metatarsal Plantar Aspect;   Erythema on the Lateral Midfoot; Right Foot;     Plan:  Chart reviewed and Patient evaluated. All Questions and Concerns Addressed and Answered  Discussed diagnosis and treatment with patient  Local Wound Care; Left foot: flush w/ normal saline; hydrogel / adaptic / kerlix / ACE Right foot: Betadine / DSD / Kerlix; Q24 B/L Dressing changes;   HomeVac To be Delivered to Patients House;   Discharged w/ Wet to Dry Dressing; VNS to Apply WoundVac;   Patient Stable Per Podiatry Standpoint; Will Need Follow Up w/ Dr. Saldaña; One Week Post Discharge;  Discussed Plan w/ Dr. Saldaña;    Podiatry  Podiatry Progress Note    Subjective:   RAD PARKS is a pleasant well-nourished, well developed 54y Male in no acute distress, alert awake, and oriented to person, place and time.  Patient is a 54y old  Male who presents with a chief complaint of Pt admitted for b/l foot ulcers w/ septic arthritis in fifth MTP on the left foot and chronic osteomyelitis of right fifth MTP on right foot. (02 Jul 2020 08:23)      PAST MEDICAL & SURGICAL HISTORY:  Back pain with sciatica  Numbness: legs  Umbilical hernia  Mild HTN  Sleep apnea: possible un diagnosed  PVD (peripheral vascular disease)  Peripheral neuropathy  Obesity  History of right nephrectomy: 02/2020       Objective:  Vital Signs Last 24 Hrs  T(C): 36.7 (02 Jul 2020 05:00), Max: 36.7 (02 Jul 2020 05:00)  T(F): 98.1 (02 Jul 2020 05:00), Max: 98.1 (02 Jul 2020 05:00)  HR: 72 (02 Jul 2020 05:00) (72 - 76)  BP: 116/73 (02 Jul 2020 05:00) (114/58 - 116/73)  BP(mean): --  RR: 18 (02 Jul 2020 05:00) (18 - 20)  SpO2: 95% (01 Jul 2020 20:18) (95% - 95%)                        12.7   4.76  )-----------( 249      ( 02 Jul 2020 06:39 )             38.0                 07-02    141  |  104  |  16  ----------------------------<  126<H>  5.0   |  26  |  1.3    Ca    9.1      02 Jul 2020 06:39  Phos  3.4     07-02  Mg     2.1     07-02    TPro  6.7  /  Alb  4.1  /  TBili  0.5  /  DBili  x   /  AST  28  /  ALT  52<H>  /  AlkPhos  81  07-02    Physical Exam - Lower Extremity Focused:   Derm:   #Left:   Open wound; plantar 5th metatarsal head  Sutures intact on the distal and proximal aspect of wound  Stable; no sign of wound dehiscence     #Right  Lateral Aspect of Midfoot; Mild erythema; No Open Wound   Plantar Aspect; Open Ulcer of the 4th metatarsal w/ fibrotic periwound, Probes Deep to Bone;     Vasc: DP & PT diminished on right foot.    Assessment:  S/p left partial 5th met head and base of resection; 6/29  Open Ulcer on the 4th Metatarsal Plantar Aspect;   Erythema on the Lateral Midfoot; Right Foot;     Plan:  Chart reviewed and Patient evaluated. All Questions and Concerns Addressed and Answered  Discussed diagnosis and treatment with patient  Local Wound Care; Left foot: flush w/ normal saline; hydrogel / adaptic / kerlix / ACE Right foot: Betadine / DSD / Kerlix; Q24 B/L Dressing changes;   Weight Bearing Status; Left Foot WBAT w/ Heel Touch; | Right Foot; No Restrictions on   Home Vac To be Delivered to Patients House;   Discharged Wound Care Orders; Hydrogel / Adaptic; Right Foot | VNS to Apply WoundVac;   Patient Stable Per Podiatry Standpoint; Will Need Follow Up w/ Dr. Saldaña; One Week Post Discharge;  Discussed Plan w/ Dr. Saldaña;    Podiatry  Podiatry Progress Note    Subjective:   RAD PARKS is a pleasant well-nourished, well developed 54y Male in no acute distress, alert awake, and oriented to person, place and time.  Patient is a 54y old  Male who presents with a chief complaint of Pt admitted for b/l foot ulcers w/ septic arthritis in fifth MTP on the left foot and chronic osteomyelitis of right fifth MTP on right foot. (02 Jul 2020 08:23)      PAST MEDICAL & SURGICAL HISTORY:  Back pain with sciatica  Numbness: legs  Umbilical hernia  Mild HTN  Sleep apnea: possible un diagnosed  PVD (peripheral vascular disease)  Peripheral neuropathy  Obesity  History of right nephrectomy: 02/2020       Objective:  Vital Signs Last 24 Hrs  T(C): 36.7 (02 Jul 2020 05:00), Max: 36.7 (02 Jul 2020 05:00)  T(F): 98.1 (02 Jul 2020 05:00), Max: 98.1 (02 Jul 2020 05:00)  HR: 72 (02 Jul 2020 05:00) (72 - 76)  BP: 116/73 (02 Jul 2020 05:00) (114/58 - 116/73)  BP(mean): --  RR: 18 (02 Jul 2020 05:00) (18 - 20)  SpO2: 95% (01 Jul 2020 20:18) (95% - 95%)                        12.7   4.76  )-----------( 249      ( 02 Jul 2020 06:39 )             38.0                 07-02    141  |  104  |  16  ----------------------------<  126<H>  5.0   |  26  |  1.3    Ca    9.1      02 Jul 2020 06:39  Phos  3.4     07-02  Mg     2.1     07-02    TPro  6.7  /  Alb  4.1  /  TBili  0.5  /  DBili  x   /  AST  28  /  ALT  52<H>  /  AlkPhos  81  07-02    Physical Exam - Lower Extremity Focused:   Derm:   #Left:   Open wound; plantar 5th metatarsal head  Sutures intact on the distal and proximal aspect of wound  Stable; no sign of wound dehiscence     #Right  Lateral Aspect of Midfoot; Mild erythema; No Open Wound   Plantar Aspect; Open Ulcer of the 4th metatarsal w/ fibrotic periwound, Probes Deep to Bone;     Vasc: DP & PT diminished on right foot.    Assessment:  S/p left partial 5th met head and base of resection; 6/29  Open Ulcer on the 4th Metatarsal Plantar Aspect;   Erythema on the Lateral Midfoot; Right Foot;     Plan:  Chart reviewed and Patient evaluated. All Questions and Concerns Addressed and Answered  Discussed diagnosis and treatment with patient  Local Wound Care; Left foot: flush w/ normal saline; hydrogel / adaptic / kerlix / ACE Right foot: Betadine / DSD / Kerlix; Q24 B/L Dressing changes;   Weight Bearing Status; Left Foot WBAT w/ Heel Touch; | Right Foot; No Restrictions on   Home Vac To be Delivered to Patients House;   Discharged Wound Care Orders; Hydrogel / Adaptic; Left Foot and Betadine Right Foot | VNS to Apply WoundVac;   Patient Stable Per Podiatry Standpoint; Will Need Follow Up w/ Dr. Saldaña; One Week Post Discharge;  Discussed Plan w/ Dr. Saldaña;    Podiatry  Podiatry Progress Note    Subjective:   RAD PARKS is a pleasant well-nourished, well developed 54y Male in no acute distress, alert awake, and oriented to person, place and time.  Patient is a 54y old  Male who presents with a chief complaint of Pt admitted for b/l foot ulcers w/ septic arthritis in fifth MTP on the left foot and chronic osteomyelitis of right fifth MTP on right foot. (02 Jul 2020 08:23)      PAST MEDICAL & SURGICAL HISTORY:  Back pain with sciatica  Numbness: legs  Umbilical hernia  Mild HTN  Sleep apnea: possible un diagnosed  PVD (peripheral vascular disease)  Peripheral neuropathy  Obesity  History of right nephrectomy: 02/2020       Objective:  Vital Signs Last 24 Hrs  T(C): 36.7 (02 Jul 2020 05:00), Max: 36.7 (02 Jul 2020 05:00)  T(F): 98.1 (02 Jul 2020 05:00), Max: 98.1 (02 Jul 2020 05:00)  HR: 72 (02 Jul 2020 05:00) (72 - 76)  BP: 116/73 (02 Jul 2020 05:00) (114/58 - 116/73)  BP(mean): --  RR: 18 (02 Jul 2020 05:00) (18 - 20)  SpO2: 95% (01 Jul 2020 20:18) (95% - 95%)                        12.7   4.76  )-----------( 249      ( 02 Jul 2020 06:39 )             38.0                 07-02    141  |  104  |  16  ----------------------------<  126<H>  5.0   |  26  |  1.3    Ca    9.1      02 Jul 2020 06:39  Phos  3.4     07-02  Mg     2.1     07-02    TPro  6.7  /  Alb  4.1  /  TBili  0.5  /  DBili  x   /  AST  28  /  ALT  52<H>  /  AlkPhos  81  07-02    Physical Exam - Lower Extremity Focused:   Derm:   #Left:   Open wound; plantar 5th metatarsal head  Sutures intact on the distal and proximal aspect of wound  Stable; no sign of wound dehiscence     #Right  Lateral Aspect of Midfoot; Mild erythema; No Open Wound   Plantar Aspect; Open Ulcer of the 4th metatarsal w/ fibrotic periwound, Probes Deep to Bone;     Vasc: DP & PT diminished on right foot.    Assessment:  S/p left partial 5th met head and base of resection; 6/29  Open Ulcer on the 4th Metatarsal Plantar Aspect;   Erythema on the Lateral Midfoot; Right Foot;     Plan:  Chart reviewed and Patient evaluated. All Questions and Concerns Addressed and Answered  Discussed diagnosis and treatment with patient  Weight Bearing Status; Left Foot WBAT w/ Heel Touch; | Right Foot; No Restrictions on   Home Vac To be Delivered to Patients House;   Discharged Wound Care Orders; Hydrogel / Adaptic; Left Foot and Betadine Right Foot | VNS to Apply WoundVac;   Patient Stable Per Podiatry Standpoint; Will Need Follow Up w/ Dr. Saldaña; One Week Post Discharge;  Discussed Plan w/ Dr. Saldaña;    Podiatry

## 2020-07-02 NOTE — DISCHARGE NOTE PROVIDER - NSDCFUSCHEDAPPT_GEN_ALL_CORE_FT
RAD PARKS ; 09/14/2020 ; [facility]PP Urology [facility] RAD PARKS ; 09/14/2020 ; NPP Urology [facility] RAD PARKS ; 09/14/2020 ; NPP Urology [facility]

## 2020-07-03 ENCOUNTER — TRANSCRIPTION ENCOUNTER (OUTPATIENT)
Age: 55
End: 2020-07-03

## 2020-07-03 VITALS
DIASTOLIC BLOOD PRESSURE: 59 MMHG | SYSTOLIC BLOOD PRESSURE: 115 MMHG | RESPIRATION RATE: 20 BRPM | TEMPERATURE: 98 F | HEART RATE: 83 BPM

## 2020-07-03 LAB
ANION GAP SERPL CALC-SCNC: 10 MMOL/L — SIGNIFICANT CHANGE UP (ref 7–14)
BUN SERPL-MCNC: 15 MG/DL — SIGNIFICANT CHANGE UP (ref 10–20)
CALCIUM SERPL-MCNC: 9.2 MG/DL — SIGNIFICANT CHANGE UP (ref 8.5–10.1)
CHLORIDE SERPL-SCNC: 101 MMOL/L — SIGNIFICANT CHANGE UP (ref 98–110)
CO2 SERPL-SCNC: 27 MMOL/L — SIGNIFICANT CHANGE UP (ref 17–32)
CREAT SERPL-MCNC: 1.5 MG/DL — SIGNIFICANT CHANGE UP (ref 0.7–1.5)
GLUCOSE BLDC GLUCOMTR-MCNC: 146 MG/DL — HIGH (ref 70–99)
GLUCOSE SERPL-MCNC: 141 MG/DL — HIGH (ref 70–99)
HCT VFR BLD CALC: 39.2 % — LOW (ref 42–52)
HGB BLD-MCNC: 13 G/DL — LOW (ref 14–18)
MCHC RBC-ENTMCNC: 28.6 PG — SIGNIFICANT CHANGE UP (ref 27–31)
MCHC RBC-ENTMCNC: 33.2 G/DL — SIGNIFICANT CHANGE UP (ref 32–37)
MCV RBC AUTO: 86.2 FL — SIGNIFICANT CHANGE UP (ref 80–94)
NRBC # BLD: 0 /100 WBCS — SIGNIFICANT CHANGE UP (ref 0–0)
PLATELET # BLD AUTO: 256 K/UL — SIGNIFICANT CHANGE UP (ref 130–400)
POTASSIUM SERPL-MCNC: 4.8 MMOL/L — SIGNIFICANT CHANGE UP (ref 3.5–5)
POTASSIUM SERPL-SCNC: 4.8 MMOL/L — SIGNIFICANT CHANGE UP (ref 3.5–5)
RBC # BLD: 4.55 M/UL — LOW (ref 4.7–6.1)
RBC # FLD: 13.5 % — SIGNIFICANT CHANGE UP (ref 11.5–14.5)
SODIUM SERPL-SCNC: 138 MMOL/L — SIGNIFICANT CHANGE UP (ref 135–146)
WBC # BLD: 6.43 K/UL — SIGNIFICANT CHANGE UP (ref 4.8–10.8)
WBC # FLD AUTO: 6.43 K/UL — SIGNIFICANT CHANGE UP (ref 4.8–10.8)

## 2020-07-03 RX ADMIN — Medication 1 APPLICATION(S): at 17:14

## 2020-07-03 RX ADMIN — LISINOPRIL 10 MILLIGRAM(S): 2.5 TABLET ORAL at 05:54

## 2020-07-03 RX ADMIN — Medication 100 MILLIGRAM(S): at 17:19

## 2020-07-03 RX ADMIN — Medication 150 MILLIGRAM(S): at 17:18

## 2020-07-03 RX ADMIN — AMLODIPINE BESYLATE 5 MILLIGRAM(S): 2.5 TABLET ORAL at 05:54

## 2020-07-03 RX ADMIN — Medication 100 MILLIGRAM(S): at 12:02

## 2020-07-03 RX ADMIN — ENOXAPARIN SODIUM 40 MILLIGRAM(S): 100 INJECTION SUBCUTANEOUS at 12:02

## 2020-07-03 RX ADMIN — Medication 100 MILLIGRAM(S): at 05:55

## 2020-07-03 RX ADMIN — Medication 150 MILLIGRAM(S): at 12:08

## 2020-07-03 RX ADMIN — Medication 150 MILLIGRAM(S): at 05:52

## 2020-07-03 NOTE — PROGRESS NOTE ADULT - PROVIDER SPECIALTY LIST ADULT
Hospitalist
Infectious Disease
Internal Medicine
Podiatry
Internal Medicine

## 2020-07-03 NOTE — DISCHARGE NOTE NURSING/CASE MANAGEMENT/SOCIAL WORK - PATIENT PORTAL LINK FT
You can access the FollowMyHealth Patient Portal offered by [facility] by registering at the following website: [email or web address]. By joining [facility]’s FollowMyHealth portal, you will also be able to view your health information using other applications (apps) compatible with our system.

## 2020-07-03 NOTE — PROGRESS NOTE ADULT - SUBJECTIVE AND OBJECTIVE BOX
Patient was seen and examined. Spoke with HO and RN. Chart reviewed.  No events overnight.  Vital Signs Last 24 Hrs  T(F): 96.9 (03 Jul 2020 05:12), Max: 96.9 (03 Jul 2020 05:12)  HR: 67 (03 Jul 2020 05:12) (67 - 75)  BP: 120/78 (03 Jul 2020 05:12) (114/68 - 126/74)  SpO2: 97% (03 Jul 2020 07:42) (96% - 97%)  MEDICATIONS  (STANDING):  amLODIPine   Tablet 5 milliGRAM(s) Oral daily  ceFAZolin   IVPB 2000 milliGRAM(s) IV Intermittent every 8 hours  chlorhexidine 4% Liquid 1 Application(s) Topical <User Schedule>  enoxaparin Injectable 40 milliGRAM(s) SubCutaneous daily  hydrocortisone 1% Cream 1 Application(s) Topical two times a day  lisinopril 10 milliGRAM(s) Oral daily  pregabalin 150 milliGRAM(s) Oral four times a day    MEDICATIONS  (PRN):    Labs:                        13.0   6.43  )-----------( 256      ( 03 Jul 2020 06:23 )             39.2                         12.7   4.76  )-----------( 249      ( 02 Jul 2020 06:39 )             38.0     03 Jul 2020 06:23    138    |  101    |  15     ----------------------------<  141    4.8     |  27     |  1.5    02 Jul 2020 06:39    141    |  104    |  16     ----------------------------<  126    5.0     |  26     |  1.3      Ca    9.2        03 Jul 2020 06:23  Ca    9.1        02 Jul 2020 06:39  Phos  3.4       02 Jul 2020 06:39  Mg     2.1       02 Jul 2020 06:39    TPro  6.7    /  Alb  4.1    /  TBili  0.5    /  DBili  x      /  AST  28     /  ALT  52     /  AlkPhos  81     02 Jul 2020 06:39          General: comfortable, NAD  Neurology: A&Ox3, nonfocal  Head:  Normocephalic, atraumatic  ENT:  Mucosa moist, no ulcerations  Neck:  Supple, no JVD,   Resp: CTA B/L  CV: RRR, S1S2,   GI: Soft, NT, bowel sounds  MS: LE bandaged      A/P:  54y old  man with b/l foot ulcers w/ septic arthritis in fifth MTP on the left foot and chronic osteomyelitis of right fifth MTP on right foot.    IV abx as per ID via PICC    wound care as per podiatry    wound vac being ordered    OOB to chair    glycemic control    weigh bearing status as per podiatry    DC today if abx and wound vac are arranged  DVT prophylaxis  Decubitus prevention- all measures as per RN protocol  Please call or text me with any questions or updates

## 2020-07-04 LAB
CULTURE RESULTS: SIGNIFICANT CHANGE UP
ORGANISM # SPEC MICROSCOPIC CNT: SIGNIFICANT CHANGE UP
SPECIMEN SOURCE: SIGNIFICANT CHANGE UP

## 2020-07-06 LAB — SURGICAL PATHOLOGY STUDY: SIGNIFICANT CHANGE UP

## 2020-07-08 DIAGNOSIS — M00.9 PYOGENIC ARTHRITIS, UNSPECIFIED: ICD-10-CM

## 2020-07-08 DIAGNOSIS — L97.419 NON-PRESSURE CHRONIC ULCER OF RIGHT HEEL AND MIDFOOT WITH UNSPECIFIED SEVERITY: ICD-10-CM

## 2020-07-08 DIAGNOSIS — L97.529 NON-PRESSURE CHRONIC ULCER OF OTHER PART OF LEFT FOOT WITH UNSPECIFIED SEVERITY: ICD-10-CM

## 2020-07-08 DIAGNOSIS — L97.516 NON-PRESSURE CHRONIC ULCER OF OTHER PART OF RIGHT FOOT WITH BONE INVOLVEMENT WITHOUT EVIDENCE OF NECROSIS: ICD-10-CM

## 2020-07-08 DIAGNOSIS — L03.116 CELLULITIS OF LEFT LOWER LIMB: ICD-10-CM

## 2020-07-08 DIAGNOSIS — I70.235 ATHEROSCLEROSIS OF NATIVE ARTERIES OF RIGHT LEG WITH ULCERATION OF OTHER PART OF FOOT: ICD-10-CM

## 2020-07-08 DIAGNOSIS — Z90.5 ACQUIRED ABSENCE OF KIDNEY: ICD-10-CM

## 2020-07-08 DIAGNOSIS — E11.621 TYPE 2 DIABETES MELLITUS WITH FOOT ULCER: ICD-10-CM

## 2020-07-08 DIAGNOSIS — K42.9 UMBILICAL HERNIA WITHOUT OBSTRUCTION OR GANGRENE: ICD-10-CM

## 2020-07-08 DIAGNOSIS — E66.9 OBESITY, UNSPECIFIED: ICD-10-CM

## 2020-07-08 DIAGNOSIS — E11.51 TYPE 2 DIABETES MELLITUS WITH DIABETIC PERIPHERAL ANGIOPATHY WITHOUT GANGRENE: ICD-10-CM

## 2020-07-08 DIAGNOSIS — M86.672 OTHER CHRONIC OSTEOMYELITIS, LEFT ANKLE AND FOOT: ICD-10-CM

## 2020-07-08 DIAGNOSIS — M86.671 OTHER CHRONIC OSTEOMYELITIS, RIGHT ANKLE AND FOOT: ICD-10-CM

## 2020-07-08 DIAGNOSIS — I70.248 ATHEROSCLEROSIS OF NATIVE ARTERIES OF LEFT LEG WITH ULCERATION OF OTHER PART OF LOWER LEG: ICD-10-CM

## 2020-07-08 DIAGNOSIS — G47.30 SLEEP APNEA, UNSPECIFIED: ICD-10-CM

## 2020-07-08 DIAGNOSIS — M54.30 SCIATICA, UNSPECIFIED SIDE: ICD-10-CM

## 2020-07-08 DIAGNOSIS — I70.245 ATHEROSCLEROSIS OF NATIVE ARTERIES OF LEFT LEG WITH ULCERATION OF OTHER PART OF FOOT: ICD-10-CM

## 2020-07-08 DIAGNOSIS — I70.238 ATHEROSCLEROSIS OF NATIVE ARTERIES OF RIGHT LEG WITH ULCERATION OF OTHER PART OF LOWER LEG: ICD-10-CM

## 2020-07-08 DIAGNOSIS — L97.526 NON-PRESSURE CHRONIC ULCER OF OTHER PART OF LEFT FOOT WITH BONE INVOLVEMENT WITHOUT EVIDENCE OF NECROSIS: ICD-10-CM

## 2020-07-08 DIAGNOSIS — E11.42 TYPE 2 DIABETES MELLITUS WITH DIABETIC POLYNEUROPATHY: ICD-10-CM

## 2020-07-08 DIAGNOSIS — M86.8X7 OTHER OSTEOMYELITIS, ANKLE AND FOOT: ICD-10-CM

## 2020-07-08 DIAGNOSIS — E11.69 TYPE 2 DIABETES MELLITUS WITH OTHER SPECIFIED COMPLICATION: ICD-10-CM

## 2020-07-08 DIAGNOSIS — I10 ESSENTIAL (PRIMARY) HYPERTENSION: ICD-10-CM

## 2020-09-07 ENCOUNTER — TRANSCRIPTION ENCOUNTER (OUTPATIENT)
Age: 55
End: 2020-09-07

## 2020-09-14 ENCOUNTER — APPOINTMENT (OUTPATIENT)
Dept: UROLOGY | Facility: CLINIC | Age: 55
End: 2020-09-14
Payer: COMMERCIAL

## 2020-09-14 PROCEDURE — 99213 OFFICE O/P EST LOW 20 MIN: CPT

## 2020-09-14 NOTE — REVIEW OF SYSTEMS
[see HPI] : see HPI [Arthralgias] : arthralgias [Hesitancy] : urinary hesitancy [Limb Weakness] : limb weakness [Negative] : Endocrine [Feeling Poorly] : not feeling poorly [Feeling Tired] : not feeling tired [Recent Weight Gain (___ Lbs)] : no recent weight gain [Limb Swelling] : no limb swelling

## 2020-09-14 NOTE — HISTORY OF PRESENT ILLNESS
[Urinary Frequency] : urinary frequency [Weak Stream] : weak stream [FreeTextEntry1] : 55 yo with right renal tumor 2/2020\par \par tubulocystic renal carcinoma\par \par doing well \par \par no complaints \par \par c/o morning hesitancy\par paternal prostate cancer\par PSA 6.0 from 12/2019 (long standing elevation in PSA - biopsy recommended by prior urologists - refused)\par

## 2020-09-14 NOTE — PHYSICAL EXAM
[Normal Appearance] : normal appearance [General Appearance - Well Developed] : well developed [General Appearance - Well Nourished] : well nourished [General Appearance - In No Acute Distress] : no acute distress [Well Groomed] : well groomed [Abdomen Tenderness] : non-tender [Abdomen Soft] : soft [Costovertebral Angle Tenderness] : no ~M costovertebral angle tenderness [Edema] : no peripheral edema [] : no respiratory distress [Respiration, Rhythm And Depth] : normal respiratory rhythm and effort [Exaggerated Use Of Accessory Muscles For Inspiration] : no accessory muscle use [Affect] : the affect was normal [Mood] : the mood was normal [Oriented To Time, Place, And Person] : oriented to person, place, and time [Not Anxious] : not anxious [Normal Station and Gait] : the gait and station were normal for the patient's age [No Focal Deficits] : no focal deficits [No Palpable Adenopathy] : no palpable adenopathy [FreeTextEntry1] : well healed right flank incision, laxity in abdominal wall

## 2020-09-14 NOTE — ASSESSMENT
[FreeTextEntry1] : 53 yo with tubulocystic renal cell carcinoma\par \par discussed pathology in detail\par reviewed the surveillance protocol\par \par non contrast CT scan\par UA for foamy urine\par PSA f/t\par chest xray\par 2 weeks to review\par f/u in 6 months

## 2020-09-14 NOTE — LETTER BODY
[Dear  ___] : Dear  [unfilled], [Sincerely,] : Sincerely, [Please see my note below.] : Please see my note below. [Consult Letter:] : I had the pleasure of evaluating your patient, [unfilled]. [FreeTextEntry3] : Lanre Strange MD, FACS\par

## 2020-09-28 ENCOUNTER — TRANSCRIPTION ENCOUNTER (OUTPATIENT)
Age: 55
End: 2020-09-28

## 2021-02-15 NOTE — ED ADULT NURSE NOTE - CAS DISCH ACCOMP BY
February 15, 2021       Danay Rock MD  [address] [place] [address]  Via In Basket      Patient: Bruno Argueta   YOB: 1990   Date of Visit: 2/15/2021       Dear Dr. Rock:    I saw your patient, Bruno Argueta, for an evaluation. Below are my notes for this visit with him.    If you have questions, please do not hesitate to call me.          Sincerely,        Jose G Issa MD        CC: No Recipients  Jose G Issa MD  2/15/2021  4:48 PM  Signed  CHIEF COMPLAINT:    New patient with obstructive sleep apnea    HISTORY OF PRESENT ILLNESS:    Bruno Argueta is a 30 year old male who came to my office today for the 1st time to establish for obstructive sleep apnea care.  Patient was diagnosed with severe obstructive sleep apnea in 2013 when he lived in [place].  Patient was started on auto CPAP 7-11 cm H2O and has been using his current CPAP device for the last 8 years.  Patient states that his CPAP has been working well but over the last few months he has noted that his CPAP does make a lot of noise is concerned that it may be malfunctioning.  He however states that he usually sleeps well through the night and wakes up refreshed in the mornings with no fatigue or drowsiness.              PAST MEDICAL HISTORY:      Seasonal allergies                                            PAST SURGICAL HISTORY:    Past Surgical History:   Procedure Laterality Date   • Lymphadenectomy      as an infant       FAMILY HISTORY:    Family History   Problem Relation Age of Onset   • Cancer Mother    • Heart Father    • Myocardial Infarction Father 62   • Coronary Artery Disease Father         stent   • Patient is unaware of any medical problems Sister    • Myocardial Infarction Maternal Grandfather 52   • Myocardial Infarction Paternal Grandmother    • Myocardial Infarction Paternal Grandfather        SOCIAL HISTORY:    Social History     Socioeconomic History   • Marital status: /Civil Union    Spouse name: Not on file   • Number of children: 2   • Years of education: Not on file   • Highest education level: Not on file   Occupational History   • Occupation:    Social Needs   • Financial resource strain: Not on file   • Food insecurity     Worry: Not on file     Inability: Not on file   • Transportation needs     Medical: Not on file     Non-medical: Not on file   Tobacco Use   • Smoking status: Never Smoker   • Smokeless tobacco: Never Used   Substance and Sexual Activity   • Alcohol use: Yes     Alcohol/week: 2.0 - 3.0 standard drinks     Types: 2 - 3 Standard drinks or equivalent per week     Frequency: 2-3 times a week     Drinks per session: 1 or 2     Binge frequency: Never   • Drug use: Never   • Sexual activity: Not on file   Lifestyle   • Physical activity     Days per week: Not on file     Minutes per session: Not on file   • Stress: Not on file   Relationships   • Social connections     Talks on phone: Not on file     Gets together: Not on file     Attends Restorationist service: Not on file     Active member of club or organization: Not on file     Attends meetings of clubs or organizations: Not on file     Relationship status: Not on file   • Intimate partner violence     Fear of current or ex partner: Not on file     Emotionally abused: Not on file     Physically abused: Not on file     Forced sexual activity: Not on file   Other Topics Concern   •  Service Not Asked   • Blood Transfusions Not Asked   • Caffeine Concern Yes     Comment: 5-6 DAILY   • Occupational Exposure Not Asked   • Hobby Hazards Not Asked   • Sleep Concern Not Asked   • Stress Concern Not Asked   • Weight Concern Not Asked   • Special Diet Not Asked   • Back Care Not Asked   • Exercise No   • Bike Helmet Not Asked   • Seat Belt Not Asked   • Self-Exams Not Asked   Social History Narrative   • Not on file       MEDICATIONS:    Current Outpatient Medications   Medication Sig Dispense Refill   • omeprazole  (PRILOSEC) 20 MG capsule Take 20 mg by mouth daily.     • Loratadine (CLARITIN PO) Take by mouth as needed.      • Fluticasone Propionate (FLONASE NA) Spray in each nostril as needed.        No current facility-administered medications for this visit.        ALLERGY:    ALLERGIES:  No Known Allergies    REVIEW OF SYSTEMS:    Constitutional:  Denies daytime somnolence or fatigue when using CPAP  Eyes:  Denies change in visual acuity   Head, Ears, Nose, Throat:  Denies nasal congestion or sore throat   Respiratory:  Denies cough or shortness of breath   Cardiovascular:  Denies chest pain or edema   Gastrointestinal:  Denies abdominal pain, nausea, vomiting, bloody stools or diarrhea   Genitourinary:  Denies dysuria   Musculoskeletal:  Denies back pain or joint pain   Integument:  Denies rash   Neurologic:  Denies headache, focal weakness or sensory changes       PHYSICAL EXAM:    Vitals:    02/15/21 1454   BP: 130/78   Pulse: 84   Resp: 16   Temp: 97.7 °F (36.5 °C)       Constitutional:  Well developed, well nourished, no acute distress, non-toxic appearance   Eyes:  PERRL, conjunctiva normal   HENT:  Atraumatic, external ears normal, nose normal, oropharynx moist, no pharyngeal exudates. Neck- normal range of motion, no tenderness, supple   Respiratory:  No respiratory distress, normal breath sounds, no rales, no wheezing   Cardiovascular:  Normal rate, normal rhythm, no murmurs, no gallops, no rubs     RESULTS:    Recent lab data reviewed.      ASSESSMENT:    1. ANGELO (obstructive sleep apnea)    2. Sleep disturbance    3. Weight gain          PLAN:    I reviewed his polysomnogram that was performed in 2013. We were able to review data from his current CPAP device and as per the data is AHI is 3.4 and average daily use is 6 hour and 56 minutes.  Current CPAP device is 80 years old and apparently noisy and I am concerned that it may not have much life left.  I have recommended that we prescribe a new auto CPAP device  at a pressure of 6-16 cm H2O and review data in 8 weeks to make sure that the CPAP is effective with current settings.  He will continue to use his current type of mask and no other measures needed at this time.  Patient was also advised to follow up once a year so that we can continue to review the data from his CPAP to make sure that it stays effective.      ORDERS AND FOLLOW UP:    Orders Placed This Encounter   • SERVICE TO HOME CARE RESPIRATORY THERAPY       Return in about 1 year (around 2/15/2022) for ANNUAL ANGELO/CPAP DOWNLOAD DATA REVIEW.    Jose G Issa MD                   Self/Transport

## 2021-02-17 NOTE — PROGRESS NOTE ADULT - ASSESSMENT
55 y/o M with hx of PVD, HTN, R nephrectomy, sciatica presenting with chronic foot ulcers, osteomyelitis, and septic arthritis.     #Septic arthritis of L 5th toe:   - ID on board, appreciate recs after culture  - Podiatry following, requests to keep pt till Friday  - HD stable  - Continue Unasyn 3 IV q6h post-op  - PICC line consult order placed for 6 weeks of Unasyn   - Wound vac placement at discharge  - Derm c/l placed for rash on R foot  -XR left foot ordered per podiatry     # PVD:   - Duplex on 6/25 showed stenosis in the arteries of lower extremity and mild to mod artherosclerotic disease occlusive diseases noted in the L popliteal and posterior tibial arteries  - Vascular surgery consulted f/u after surgery     # s/p R nephrectomy  - renal function stable  - BUN 16 (6/30) Cr 1.3 (6/30) eGFR 61 (6/30)     #HTN:  - BP stable  - Continue home BP meds, amlodipine 5mg  - Takes benzapril 10mg at home will give Lisinopril 10mg    #Sciatica  - Pregabalin 150mg    DVT ppx: Enoxaparin 40mg  Diet: DASH, Sodium and cholesterol restricted  Activity: Weight bearing as tolerated  Code: Full code Patent

## 2021-03-15 ENCOUNTER — APPOINTMENT (OUTPATIENT)
Dept: UROLOGY | Facility: CLINIC | Age: 56
End: 2021-03-15
Payer: COMMERCIAL

## 2021-03-15 DIAGNOSIS — R97.20 ELEVATED PROSTATE, SPECIFIC ANTIGEN [PSA]: ICD-10-CM

## 2021-03-15 PROCEDURE — 99214 OFFICE O/P EST MOD 30 MIN: CPT

## 2021-03-15 PROCEDURE — 99072 ADDL SUPL MATRL&STAF TM PHE: CPT

## 2021-03-15 RX ORDER — TAMSULOSIN HYDROCHLORIDE 0.4 MG/1
0.4 CAPSULE ORAL
Qty: 30 | Refills: 11 | Status: ACTIVE | COMMUNITY
Start: 2021-03-15 | End: 1900-01-01

## 2021-03-15 NOTE — ASSESSMENT
[FreeTextEntry1] : 56 yo with tubulocystic renal cell carcinoma\par \par discussed PSA and nocturia in detail\par reviewed the surveillance protocol\par \par Renal and Bladder US, chest X-Ray in 6 months \par 4K\par chest xray\par 2 weeks to review\par flomax 0.4 mg po qhs\par f/u in 6 months

## 2021-03-15 NOTE — HISTORY OF PRESENT ILLNESS
[Urinary Frequency] : urinary frequency [Weak Stream] : weak stream [FreeTextEntry1] : 54 yo with right renal tumor 2/2020\par \par tubulocystic renal carcinoma 2/2020\par \par doing well \par \par no complaints \par \par c/o morning hesitancy\par paternal prostate cancer\par PSA 6.0 from 12/2019 (long standing elevation in PSA - biopsy recommended by prior urologists - refused)\par PSA 6.7 on 9/2020\par no evidence of UTI\par \par C/O - nocturia \par \par CT scan 12/2020\par no recurrence\par \par chest X-ray\par no recurrence \par

## 2021-03-15 NOTE — PHYSICAL EXAM
[General Appearance - Well Developed] : well developed [General Appearance - Well Nourished] : well nourished [Normal Appearance] : normal appearance [Well Groomed] : well groomed [General Appearance - In No Acute Distress] : no acute distress [Abdomen Soft] : soft [Abdomen Tenderness] : non-tender [Costovertebral Angle Tenderness] : no ~M costovertebral angle tenderness [Edema] : no peripheral edema [] : no respiratory distress [Respiration, Rhythm And Depth] : normal respiratory rhythm and effort [Exaggerated Use Of Accessory Muscles For Inspiration] : no accessory muscle use [Oriented To Time, Place, And Person] : oriented to person, place, and time [Affect] : the affect was normal [Mood] : the mood was normal [Not Anxious] : not anxious [Normal Station and Gait] : the gait and station were normal for the patient's age [No Focal Deficits] : no focal deficits [No Palpable Adenopathy] : no palpable adenopathy [FreeTextEntry1] : well healed right flank incision, laxity in abdominal wall

## 2021-03-15 NOTE — REVIEW OF SYSTEMS
[see HPI] : see HPI [Hesitancy] : urinary hesitancy [Arthralgias] : arthralgias [Limb Weakness] : limb weakness [Negative] : Heme/Lymph [Feeling Poorly] : not feeling poorly [Feeling Tired] : not feeling tired [Recent Weight Gain (___ Lbs)] : no recent weight gain [Limb Swelling] : no limb swelling

## 2021-04-07 LAB
ANION GAP SERPL CALC-SCNC: 13 MMOL/L
BUN SERPL-MCNC: 15 MG/DL
CALCIUM SERPL-MCNC: 9.3 MG/DL
CHLORIDE SERPL-SCNC: 97 MMOL/L
CO2 SERPL-SCNC: 25 MMOL/L
CREAT SERPL-MCNC: 1.2 MG/DL
GLUCOSE SERPL-MCNC: 318 MG/DL
POTASSIUM SERPL-SCNC: 4 MMOL/L
SODIUM SERPL-SCNC: 135 MMOL/L

## 2021-04-12 LAB
4K SCORE CALCULATION: 43 %
FREE PSA: 0.35 NG/ML
PERCENT FREE PSA: 6 %
TOTAL PSA: 5.72 NG/ML

## 2021-08-07 NOTE — PROGRESS NOTE ADULT - ASSESSMENT
95 55 yo M with hx of PVD presents to ED with chronic foot ulcers, osteomyelitis, and septic arthritis.    # Septic arthritis of Left 5th toe  - ID on board- holding abx for bone biopsy  - Podiatry following, surgery w/ Dr. Saldaña on 06/29 @ 12:30   - HD stable  - Cleared for surgery, NPO @ midnight  - Per ID: start unasyn 3g IV 6qh after surgery    # PVD  - Duplex on 06/25 showed stenosis in the arteries of right lower extremity and mild-to-moderate atherosclerotic occlusive disease noted in the left popliteal and posterior tibial arteries.  - Vascular surgery consulted f/u after surgery     # s/p Right Nephrectomy  - Renal function stable  - BUN 15 (6/25) Cr 1.3 (6/25) eGFR 61 (6/25)    # HTN  - BP stable  - Continue home BP meds, amlodipine 5mg  - Takes benzapril 10 mg at home will give lisinopril 10mg.     # Sciatica   - Pregabalin 150mg    DVT ppx: Enoxaparin 40mg  Diet: DASH, Sodium and Cholesterol restricted  Activity: Weight bearing as tolerated  Code: Full code

## 2021-09-27 ENCOUNTER — APPOINTMENT (OUTPATIENT)
Dept: UROLOGY | Facility: CLINIC | Age: 56
End: 2021-09-27
Payer: COMMERCIAL

## 2021-09-27 VITALS — BODY MASS INDEX: 28.44 KG/M2 | WEIGHT: 210 LBS | HEIGHT: 72 IN

## 2021-09-27 DIAGNOSIS — R93.49 ABNORMAL RADIOLOGIC FINDINGS ON DIAGNOSITIC IMAGING OF OTHER URINARY ORGANS: ICD-10-CM

## 2021-09-27 PROCEDURE — 99214 OFFICE O/P EST MOD 30 MIN: CPT

## 2021-09-27 NOTE — LETTER BODY
[Dear  ___] : Dear  [unfilled], [Please see my note below.] : Please see my note below. [Consult Letter:] : I had the pleasure of evaluating your patient, [unfilled]. [Sincerely,] : Sincerely, [FreeTextEntry3] : Lanre Strange MD, FACS\par

## 2021-09-27 NOTE — ASSESSMENT
[FreeTextEntry1] : 54 yo with tubulocystic renal cell carcinoma\par \par - MRI of the prostate pending\par - Dr. Geiger for hernia repair\par - f/u in 6 weeks to review

## 2021-09-27 NOTE — PHYSICAL EXAM
[General Appearance - Well Developed] : well developed [Normal Appearance] : normal appearance [General Appearance - Well Nourished] : well nourished [Well Groomed] : well groomed [General Appearance - In No Acute Distress] : no acute distress [Abdomen Soft] : soft [Abdomen Tenderness] : non-tender [Costovertebral Angle Tenderness] : no ~M costovertebral angle tenderness [Edema] : no peripheral edema [] : no respiratory distress [Exaggerated Use Of Accessory Muscles For Inspiration] : no accessory muscle use [Respiration, Rhythm And Depth] : normal respiratory rhythm and effort [Oriented To Time, Place, And Person] : oriented to person, place, and time [Affect] : the affect was normal [Mood] : the mood was normal [Not Anxious] : not anxious [Normal Station and Gait] : the gait and station were normal for the patient's age [No Focal Deficits] : no focal deficits [No Palpable Adenopathy] : no palpable adenopathy [FreeTextEntry1] : well healed right flank incision, laxity in abdominal wall / incisional hernia - umbilical hernia

## 2021-09-27 NOTE — HISTORY OF PRESENT ILLNESS
[Urinary Frequency] : urinary frequency [Weak Stream] : weak stream [FreeTextEntry1] : 54 yo with right renal tumor 2/2020\par \par tubulocystic renal carcinoma 2/2020\par \par doing well \par \par recently diagnosed with diabetes - lost over 50 lbs\par \par 4K - 43% (4/2021)\par PSA 5.72 ng/ml\par 6% free\par \par MRI scheduled for next month\par \par C/O - nocturia \par \par renal US 9/13/2021\par no recurrence\par \par chest X-ray\par \par

## 2021-10-22 ENCOUNTER — LABORATORY RESULT (OUTPATIENT)
Age: 56
End: 2021-10-22

## 2021-10-22 ENCOUNTER — OUTPATIENT (OUTPATIENT)
Dept: OUTPATIENT SERVICES | Facility: HOSPITAL | Age: 56
LOS: 1 days | Discharge: HOME | End: 2021-10-22

## 2021-10-22 DIAGNOSIS — Z90.5 ACQUIRED ABSENCE OF KIDNEY: Chronic | ICD-10-CM

## 2021-10-22 DIAGNOSIS — Z11.59 ENCOUNTER FOR SCREENING FOR OTHER VIRAL DISEASES: ICD-10-CM

## 2021-10-25 ENCOUNTER — RESULT REVIEW (OUTPATIENT)
Age: 56
End: 2021-10-25

## 2021-11-08 ENCOUNTER — APPOINTMENT (OUTPATIENT)
Dept: UROLOGY | Facility: CLINIC | Age: 56
End: 2021-11-08
Payer: MEDICARE

## 2021-11-08 VITALS — HEIGHT: 72 IN | WEIGHT: 200 LBS | BODY MASS INDEX: 27.09 KG/M2

## 2021-11-08 DIAGNOSIS — R97.20 ELEVATED PROSTATE, SPECIFIC ANTIGEN [PSA]: ICD-10-CM

## 2021-11-08 DIAGNOSIS — C64.9 MALIGNANT NEOPLASM OF UNSPECIFIED KIDNEY, EXCEPT RENAL PELVIS: ICD-10-CM

## 2021-11-08 PROCEDURE — 99214 OFFICE O/P EST MOD 30 MIN: CPT

## 2021-11-08 RX ORDER — ENEMA 19; 7 G/133ML; G/133ML
7-19 ENEMA RECTAL
Qty: 1 | Refills: 0 | Status: ACTIVE | COMMUNITY
Start: 2021-11-08 | End: 1900-01-01

## 2021-11-08 NOTE — HISTORY OF PRESENT ILLNESS
[FreeTextEntry1] : This is a 56 year old male with history of Right Renal Tumor s/p Right Radical Nephrectomy.\par Tubulocystic Renal Carcinoma 02/2020. \par \par Patient presents to office today to review MRI of Prostate and elevated PSA. Patient has a history of elevated PSA which was as high as 8.0 ng/mL as per patient. Patient states that he saw a prior Urologist who recommended a Biopsy but patient did not want to have a biopsy done. Patient had an MRI of Prostate in 2019 which revealed a PIRADS 3 lesion at left lateral prostate gland. \par Patient repeated MRI of prostate after 4 K score revealed 46 %. MRI of prostate done 10/26/2021 7 x 6 mm PIRADS 3 lesion at the left lateral peripheral zone at the mid gland. \par \par Patient has lost 60 lbs intentionally after being diagnosed with diabetes. PAtient states that his hemoglobin A1c was above 10. He reports that most recent Hemoglobin A1c is 5.7. \par \par Previous Imaging:\par renal US 9/13/2021\par no recurrence\par

## 2021-11-08 NOTE — ASSESSMENT
[FreeTextEntry1] : 56 year old with Tubulocystic Renal Cell Carcinoma. \par Elevated PSA and PIRADS 3 lesion on 2 MRI of prostate. \par Prostate size on most recent MRI is 50 cc. PSA density 0.114\par \par Discussed with patient MRI guided fusion biopsy. Procedure was explained to patient. Patient is aware that he needs OR clearance. Patient is aware he needs to use fleet enema the morning of procedure. After discussion, patient is agreeable. \par \par Plan\par -Schedule OR for MRI guided biopsy with Dr. Saavedra. \par -Urinalysis and Urine Culture\par -OR forms provided for patients primary medical doctor to fill out. \par

## 2021-11-08 NOTE — ADDENDUM
[FreeTextEntry1] : Documented by MELONIE Rowley acting as a scribe for Dr. Lanre Strange \par \par All medical record entries made by the Scribe were at my, Dr. Strange direction and\par personally dictated by me.  I have reviewed the chart and agree that the record\par accurately reflects my personal performance of the history, physical exam, procedure and imaging.  \par  \par \par

## 2021-11-09 ENCOUNTER — NON-APPOINTMENT (OUTPATIENT)
Age: 56
End: 2021-11-09

## 2021-11-09 LAB
APPEARANCE: CLEAR
BILIRUBIN URINE: NEGATIVE
BLOOD URINE: NEGATIVE
COLOR: YELLOW
GLUCOSE QUALITATIVE U: ABNORMAL
KETONES URINE: NEGATIVE
LEUKOCYTE ESTERASE URINE: NEGATIVE
NITRITE URINE: NEGATIVE
PH URINE: 6
PROTEIN URINE: NORMAL
SPECIFIC GRAVITY URINE: 1.03
UROBILINOGEN URINE: NORMAL

## 2021-11-15 ENCOUNTER — NON-APPOINTMENT (OUTPATIENT)
Age: 56
End: 2021-11-15

## 2021-11-16 ENCOUNTER — NON-APPOINTMENT (OUTPATIENT)
Age: 56
End: 2021-11-16

## 2021-11-16 DIAGNOSIS — N39.0 URINARY TRACT INFECTION, SITE NOT SPECIFIED: ICD-10-CM

## 2021-11-16 RX ORDER — AMOXICILLIN AND CLAVULANATE POTASSIUM 500; 125 MG/1; MG/1
500-125 TABLET, FILM COATED ORAL
Qty: 14 | Refills: 0 | Status: ACTIVE | COMMUNITY
Start: 2021-11-16 | End: 1900-01-01

## 2021-12-02 ENCOUNTER — APPOINTMENT (OUTPATIENT)
Dept: UROLOGY | Facility: HOSPITAL | Age: 56
End: 2021-12-02

## 2022-02-14 LAB
PSA FREE FLD-MCNC: 10 %
PSA FREE SERPL-MCNC: 0.54 NG/ML
PSA SERPL-MCNC: 5.5 NG/ML

## 2022-04-06 ENCOUNTER — APPOINTMENT (OUTPATIENT)
Dept: UROLOGY | Facility: CLINIC | Age: 57
End: 2022-04-06

## 2022-12-04 NOTE — PRE-ANESTHESIA EVALUATION ADULT - NSANTHAPLANRD_GEN_ALL_CORE
COVID Positive  Start vitamin C 1000mg twice a day, zinc 100mg once a day, and vitamin D3 at least 2000 units a day. Current CDC guidelines recommend that you quarantine for 5 days starting the day after your symptoms began. Quarantine can end after 5 days as long as the last 24 hours of quarantine you are fever free and there is improvement of all your symptoms. Wear a mask around others for 5 additional days after quarantine. Treat your symptoms as you would the common cold. If you live with anybody, isolate yourself in a separate bedroom and use a separate bathroom. If your symptoms worsen or you develop shortness of breath or a fever over 102.5 , seek medical attention immediately.     
general

## 2024-03-12 NOTE — ANESTHESIA FOLLOW-UP NOTE - ELECTIVE PROCEDURE
Called [facility] and was able to get his Zepbound approved until 11/7/2024. Case number # [id number].    I called patient to make him aware but n/a lmov for him to call back   
Yes